# Patient Record
Sex: MALE | Employment: FULL TIME | URBAN - METROPOLITAN AREA
[De-identification: names, ages, dates, MRNs, and addresses within clinical notes are randomized per-mention and may not be internally consistent; named-entity substitution may affect disease eponyms.]

---

## 2017-01-19 ENCOUNTER — OFFICE VISIT (OUTPATIENT)
Dept: FAMILY MEDICINE | Facility: CLINIC | Age: 32
End: 2017-01-19
Payer: COMMERCIAL

## 2017-01-19 ENCOUNTER — RADIANT APPOINTMENT (OUTPATIENT)
Dept: GENERAL RADIOLOGY | Facility: CLINIC | Age: 32
End: 2017-01-19
Attending: FAMILY MEDICINE
Payer: COMMERCIAL

## 2017-01-19 VITALS
TEMPERATURE: 98.6 F | BODY MASS INDEX: 34.65 KG/M2 | WEIGHT: 242 LBS | HEART RATE: 86 BPM | HEIGHT: 70 IN | OXYGEN SATURATION: 100 % | DIASTOLIC BLOOD PRESSURE: 80 MMHG | SYSTOLIC BLOOD PRESSURE: 121 MMHG

## 2017-01-19 DIAGNOSIS — R00.2 PALPITATIONS: ICD-10-CM

## 2017-01-19 DIAGNOSIS — R07.89 CHEST DISCOMFORT: ICD-10-CM

## 2017-01-19 DIAGNOSIS — R07.89 CHEST DISCOMFORT: Primary | ICD-10-CM

## 2017-01-19 PROCEDURE — 71020 XR CHEST 2 VW: CPT

## 2017-01-19 PROCEDURE — 93000 ELECTROCARDIOGRAM COMPLETE: CPT | Performed by: FAMILY MEDICINE

## 2017-01-19 PROCEDURE — 99214 OFFICE O/P EST MOD 30 MIN: CPT | Performed by: FAMILY MEDICINE

## 2017-01-19 ASSESSMENT — PAIN SCALES - GENERAL: PAINLEVEL: NO PAIN (0)

## 2017-01-19 NOTE — PROGRESS NOTES
"  SUBJECTIVE:                                                    Jean Cross is a 32 year old male who presents to clinic today for the following health issues:      CHEST PAIN     Onset: about 3-4 days ago     Description:   Location:  left side  Character: heavy  Radiation: none  Duration: constant     Intensity: mild    Progression of Symptoms:  worsening    Accompanying Signs & Symptoms:  Shortness of breath: no  Sweating: no  Nausea/vomiting: no  Lightheadedness: no  Palpitations: YES  Fever/Chills: no  Cough: YES  Heartburn: no    History:   Family history of heart disease no  Tobacco use: no    Precipitating factors:   Worse with exertion: no  Worse with deep breaths :  no  Related to food: no    Alleviating factors:  none       Therapies Tried and outcome: none      Problem list and histories reviewed & adjusted, as indicated.  Additional history: as documented    Problem list, Medication list, Allergies, and Medical/Social/Surgical histories reviewed in EPIC and updated as appropriate.    ROS:  Constitutional, HEENT, cardiovascular, pulmonary, GI, , musculoskeletal, neuro, skin, endocrine and psych systems are negative, except as otherwise noted.    OBJECTIVE:                                                    /80 mmHg  Pulse 86  Temp(Src) 98.6  F (37  C) (Oral)  Ht 5' 10\" (1.778 m)  Wt 242 lb (109.77 kg)  BMI 34.72 kg/m2  SpO2 100%  Body mass index is 34.72 kg/(m^2).  GENERAL: healthy, alert and no distress  NECK: no adenopathy, no asymmetry, masses, or scars and thyroid normal to palpation  RESP: lungs clear to auscultation - no rales, rhonchi or wheezes  CV: regular rate and rhythm, normal S1 S2, no S3 or S4, no murmur, click or rub, no peripheral edema and peripheral pulses strong  ABDOMEN: soft, nontender, no hepatosplenomegaly, no masses and bowel sounds normal  MS: no gross musculoskeletal defects noted, no edema    Diagnostic Test Results:  none      ASSESSMENT/PLAN:                   "                                    1. Chest discomfort  Unclear etiology? No acute findings on ekg or cxr. R/o arrhythmia below with zio patch monitor. Further evaluate cardiac status with stress echo.  - EKG 12-lead complete w/read - Clinics  - XR Chest 2 Views; Future  - Exercise Stress Echocardiogram; Future    2. Palpitations    - Zio Patch 24 Hours; Future    See Patient Instructions    Gadiel Gamez MD, MD  [facility]

## 2017-01-19 NOTE — MR AVS SNAPSHOT
After Visit Summary   1/19/2017    Jean Cross    MRN: [id number]           Patient Information     Date Of Birth          1985        Visit Information        Provider Department      1/19/2017 4:00 PM Gadiel Gamez MD [facility]        Today's Diagnoses     Chest discomfort    -  1     Palpitations           Care Instructions        ================================================================================  Normal Values   Blood pressure  <140/90 for most adults    <130/80 for some chronic diseases (ask your care team about yours)    BMI (body mass index)  18.5-25 kg/m2 (based on height and weight)     Thank you for visiting [facility]    Normal or non-critical lab and imaging results will be communicated to you by MyChart, letter or phone within 7 days.  If you do not hear from us within 10 days, please call the clinic. If you have a critical or abnormal lab result, we will notify you by phone as soon as possible.     If you have any questions regarding your visit please contact:     Team Comfort:   Clinic Hours Telephone Number   Dr. James Cerna 7am-5pm  Monday - Friday [phone]  Adarsh Almanza RN   Pharmacy 8:00am-8pm Monday-Friday    9am-5pm Saturday-Sunday [phone]   Urgent Care 11am-9pm Monday-Friday        9am-5pm Saturday-Sunday [phone]     After hours, weekend or if you need to make an appointment with your primary provider please call [phone].   After Hours nurse advise: call [place] [facility]: [phone]    Medication Refills:  Call your pharmacy and they will forward the refill to us. Please allow 3 business days for your refills to be completed.                Follow-ups after your visit        Your next 10 appointments already scheduled     Jan 20, 2017  3:45 PM   CT ABDOMEN PELVIS W/O CONTRAST with MGCT1   [facility] ([facility])    [address] [place] [id number] [phone]           Please bring any scans or X-rays taken at other hospitals, if similar tests were done. Also bring a list of your medicines, including vitamins, minerals and over-the-counter drugs. It is safest to leave personal items at home.  Be sure to tell your doctor:   If you have any allergies.   If there s any chance you are pregnant.   If you are breastfeeding.   If you have any special needs.  You do not need to do anything special to prepare.  Please wear loose clothing, such as a sweat suit or jogging clothes. Avoid snaps, zippers and other metal. We may ask you to undress and put on a hospital gown.              Future tests that were ordered for you today     Open Future Orders        Priority Expected Expires Ordered    Exercise Stress Echocardiogram Routine  1/19/2018 1/19/2017    Zio Patch 24 Hours Routine  3/5/2017 1/19/2017    XR Chest 2 Views Routine 1/19/2017 1/19/2018 1/19/2017            Who to contact     If you have questions or need follow up information about today's clinic visit or your schedule please contact [facility] directly at [phone].  Normal or non-critical lab and imaging results will be communicated to you by Goods Platformhart, letter or phone within 4 business days after the clinic has received the results. If you do not hear from us within 7 days, please contact the clinic through TraktoPROt or phone. If you have a critical or abnormal lab result, we will notify you by phone as soon as possible.  Submit refill requests through Spartz or call your pharmacy and they will forward the refill request to us. Please allow 3 business days for your refill to be completed.          Additional Information About Your Visit        Spartz Information     Spartz gives you secure access to your electronic health record. If you see a primary care provider, you can also send messages  "to your care team and make appointments. If you have questions, please call your primary care clinic.  If you do not have a primary care provider, please call [phone] and they will assist you.        Care EveryWhere ID     This is your Care EveryWhere ID. This could be used by other organizations to access your [place] medical records  [id number]        Your Vitals Were     Pulse Temperature Height BMI (Body Mass Index) Pulse Oximetry       86 98.6  F (37  C) (Oral) 5' 10\" (1.778 m) 34.72 kg/m2 100%        Blood Pressure from Last 3 Encounters:   01/19/17 121/80   08/05/16 121/74   07/15/16 125/71    Weight from Last 3 Encounters:   01/19/17 242 lb (109.77 kg)   07/15/16 237 lb (107.502 kg)   06/22/16 240 lb (108.863 kg)              We Performed the Following     EKG 12-lead complete w/read - Clinics        Primary Care Provider Office Phone # Fax #    Gadiel Gamez [phone]       [facility] [address]  [facility] [address]        Thank you!     Thank you for choosing [facility]  for your care. Our goal is always to provide you with excellent care. Hearing back from our patients is one way we can continue to improve our services. Please take a few minutes to complete the written survey that you may receive in the mail after your visit with us. Thank you!             Your Updated Medication List - Protect others around you: Learn how to safely use, store and throw away your medicines at www.disposemymeds.org.          This list is accurate as of: 1/19/17  4:28 PM.  Always use your most recent med list.                   Brand Name Dispense Instructions for use    lisinopril 10 MG tablet    PRINIVIL/ZESTRIL    90 tablet    Take 1 tablet (10 mg) by mouth daily       omeprazole 20 MG CR capsule    priLOSEC    90 capsule    Take 1 capsule (20 mg) by mouth daily         "

## 2017-01-19 NOTE — NURSING NOTE
"Chief Complaint   Patient presents with     Chest Pain     heavy       Initial /80 mmHg  Pulse 86  Temp(Src) 98.6  F (37  C) (Oral)  Ht 5' 10\" (1.778 m)  Wt 242 lb (109.77 kg)  BMI 34.72 kg/m2  SpO2 100% Estimated body mass index is 34.72 kg/(m^2) as calculated from the following:    Height as of this encounter: 5' 10\" (1.778 m).    Weight as of this encounter: 242 lb (109.77 kg).  BP completed using cuff size: jenny Castrejon MA      "

## 2017-01-19 NOTE — PATIENT INSTRUCTIONS
================================================================================  Normal Values   Blood pressure  <140/90 for most adults    <130/80 for some chronic diseases (ask your care team about yours)    BMI (body mass index)  18.5-25 kg/m2 (based on height and weight)     Thank you for visiting [facility]    Normal or non-critical lab and imaging results will be communicated to you by MyChart, letter or phone within 7 days.  If you do not hear from us within 10 days, please call the clinic. If you have a critical or abnormal lab result, we will notify you by phone as soon as possible.     If you have any questions regarding your visit please contact:     Team Comfort:   Clinic Hours Telephone Number   Dr. James Cerna 7am-5pm  Monday - Friday [phone]  Adarsh Almanza RN   Pharmacy 8:00am-8pm Monday-Friday    9am-5pm Saturday-Sunday [phone]   Urgent Care 11am-9pm Monday-Friday        9am-5pm Saturday-Sunday [phone]     After hours, weekend or if you need to make an appointment with your primary provider please call [phone].   After Hours nurse advise: call [place] [facility]: [phone]    Medication Refills:  Call your pharmacy and they will forward the refill to us. Please allow 3 business days for your refills to be completed.

## 2017-01-20 ENCOUNTER — RADIANT APPOINTMENT (OUTPATIENT)
Dept: CT IMAGING | Facility: CLINIC | Age: 32
End: 2017-01-20
Attending: FAMILY MEDICINE
Payer: COMMERCIAL

## 2017-01-20 DIAGNOSIS — I88.0 MESENTERIC LYMPHADENITIS: ICD-10-CM

## 2017-01-20 DIAGNOSIS — N28.1 RENAL CYST: ICD-10-CM

## 2017-01-20 PROCEDURE — 74177 CT ABD & PELVIS W/CONTRAST: CPT | Performed by: RADIOLOGY

## 2017-01-20 RX ORDER — IOPAMIDOL 755 MG/ML
135 INJECTION, SOLUTION INTRAVASCULAR ONCE
Status: COMPLETED | OUTPATIENT
Start: 2017-01-20 | End: 2017-01-20

## 2017-01-20 RX ADMIN — IOPAMIDOL 135 ML: 755 INJECTION, SOLUTION INTRAVASCULAR at 15:48

## 2017-01-24 ENCOUNTER — MYC MEDICAL ADVICE (OUTPATIENT)
Dept: FAMILY MEDICINE | Facility: CLINIC | Age: 32
End: 2017-01-24

## 2017-01-24 ENCOUNTER — TELEPHONE (OUTPATIENT)
Dept: CARDIOLOGY | Facility: CLINIC | Age: 32
End: 2017-01-24

## 2017-01-24 DIAGNOSIS — I88.0 MESENTERIC LYMPHADENITIS: Primary | ICD-10-CM

## 2017-01-24 NOTE — TELEPHONE ENCOUNTER
Team - please inform patient that provider placed a new referral with the [facility].  Number to call to schedule appointment is [phone].  Nan Steele RN

## 2017-01-24 NOTE — TELEPHONE ENCOUNTER
Called patient prior to stress echo scheduled for Jan 26 to discuss necessary preparation for test and assess for questions/concerns.  Reminded patient to remain NPO except water for at least 3 hours prior to test, take all medications as usual, to avoid caffeine and nicotine for 12 hr (including chocolate, decaf, Excedrin) and to wear comfortable clothing and shoes. Also informed patient that procedure is on a recumbent bike rather than treadmill. All questions answered, patient verbalized understanding.

## 2017-01-24 NOTE — TELEPHONE ENCOUNTER
Reason for Call:  Other appointment    Detailed comments: Pt called for he tried to make an apt with the GI Specialist and they told him that only Dr. Gamez can make the apt.  He would like for Dr. Gamez to call to make the apt as soon as possible and then to contact the Pt to confirm that an apt has been made.     Phone Number Patient can be reached at: Other phone number:  [phone]    Best Time: Anytime    Can we leave a detailed message on this number? YES    Call taken on 1/24/2017 at 4:22 PM by Kelvin Abdi

## 2017-01-26 ENCOUNTER — RADIANT APPOINTMENT (OUTPATIENT)
Dept: CARDIOLOGY | Facility: CLINIC | Age: 32
End: 2017-01-26
Attending: FAMILY MEDICINE
Payer: COMMERCIAL

## 2017-01-26 VITALS — DIASTOLIC BLOOD PRESSURE: 69 MMHG | SYSTOLIC BLOOD PRESSURE: 113 MMHG | HEART RATE: 89 BPM

## 2017-01-26 DIAGNOSIS — R00.2 PALPITATIONS: ICD-10-CM

## 2017-01-26 DIAGNOSIS — I47.10 PAROXYSMAL SUPRAVENTRICULAR TACHYCARDIA (H): Primary | ICD-10-CM

## 2017-01-26 DIAGNOSIS — R07.89 CHEST DISCOMFORT: ICD-10-CM

## 2017-01-26 PROCEDURE — 93325 DOPPLER ECHO COLOR FLOW MAPG: CPT | Mod: TC | Performed by: INTERNAL MEDICINE

## 2017-01-26 PROCEDURE — 93350 STRESS TTE ONLY: CPT | Mod: TC | Performed by: INTERNAL MEDICINE

## 2017-01-26 PROCEDURE — 93350 STRESS TTE ONLY: CPT | Mod: 26 | Performed by: INTERNAL MEDICINE

## 2017-01-26 PROCEDURE — 93225 XTRNL ECG REC<48 HRS REC: CPT

## 2017-01-26 PROCEDURE — 93325 DOPPLER ECHO COLOR FLOW MAPG: CPT | Mod: 26 | Performed by: INTERNAL MEDICINE

## 2017-01-26 PROCEDURE — 93321 DOPPLER ECHO F-UP/LMTD STD: CPT | Mod: TC | Performed by: INTERNAL MEDICINE

## 2017-01-26 PROCEDURE — 93321 DOPPLER ECHO F-UP/LMTD STD: CPT | Mod: 26 | Performed by: INTERNAL MEDICINE

## 2017-01-26 PROCEDURE — 93016 CV STRESS TEST SUPVJ ONLY: CPT | Performed by: INTERNAL MEDICINE

## 2017-01-26 PROCEDURE — 93352 ADMIN ECG CONTRAST AGENT: CPT | Performed by: INTERNAL MEDICINE

## 2017-01-26 PROCEDURE — 93227 XTRNL ECG REC<48 HR R&I: CPT

## 2017-01-26 PROCEDURE — 93017 CV STRESS TEST TRACING ONLY: CPT | Performed by: INTERNAL MEDICINE

## 2017-01-26 PROCEDURE — 93018 CV STRESS TEST I&R ONLY: CPT | Performed by: INTERNAL MEDICINE

## 2017-01-26 RX ADMIN — Medication 6 ML: at 15:49

## 2017-01-26 NOTE — PROGRESS NOTES
Patient has been prescribed a ZioPatch holter for 1 days.  Patient was instructed regarding the indication, function, care and prompt return of the ZioPatch holter monitor. The monitor, with S/N [id number],  was placed on the patient with instructions regarding care of the skin, electrodes, and monitor, as well as documentation in the patient diary. Patient demonstrated understanding of this information and agreed to call [employer] with further questions or concerns.

## 2017-01-26 NOTE — PROGRESS NOTES
Patient presents today for stress echo ordered by MD. Prior to patient visit, chart prep by CMA done including confirmation of order, medications reviewed for contraindications, reviewed previous EKG's for trends & concerns and reviewed patient's medical history.    IV started in R AC with a 22G Jeclo Catheter.     Echo technician completed resting portion of echo.    Stress portion of echo completed utilizing bike and pictures taken at peak.  Blood pressure taken every 2 minutes and documented in Muse system.    Difinity medication used 6cc, wasted 4cc Definity NDC # 67373-162-12 (1.5ml Definity mixed with 8.5ml Saline )  Atropine medication given  NONE Atropine NDC# 07309-334-54     Patient offered complaints of: NONE    After completion of stress echo, recovery period with blood pressure monitoring occurs at 1, 3 and 5 minutes and documented in Muse.  IV removed and water provided to patient.    Patient education provided about cardiology interpretation and primary provider will be notified of results.    Dr. Gamez provided supervision of the tests performed today.    Marni Diehl, CCT, Cardiac CMA

## 2017-01-31 ENCOUNTER — PRE VISIT (OUTPATIENT)
Dept: GASTROENTEROLOGY | Facility: CLINIC | Age: 32
End: 2017-01-31

## 2017-01-31 NOTE — TELEPHONE ENCOUNTER
1.  Date/reason for appt: 2/28/17 -- mesenteric lymphadenitis  2.  Referring provider: Gadiel Gamez  3.  Call to patient (Yes / No - short description): no, referred  4.  Previous care at / records requested from: [facility] -- records and imaging in epic/pacs

## 2017-02-07 PROBLEM — I47.10 PAROXYSMAL SUPRAVENTRICULAR TACHYCARDIA (H): Status: ACTIVE | Noted: 2017-02-07

## 2017-02-28 ENCOUNTER — OFFICE VISIT (OUTPATIENT)
Dept: GASTROENTEROLOGY | Facility: CLINIC | Age: 32
End: 2017-02-28

## 2017-02-28 VITALS
OXYGEN SATURATION: 100 % | BODY MASS INDEX: 34.02 KG/M2 | HEART RATE: 75 BPM | HEIGHT: 71 IN | WEIGHT: 243 LBS | DIASTOLIC BLOOD PRESSURE: 76 MMHG | SYSTOLIC BLOOD PRESSURE: 127 MMHG

## 2017-02-28 DIAGNOSIS — E55.9 VITAMIN D DEFICIENCY: ICD-10-CM

## 2017-02-28 DIAGNOSIS — R59.0 MESENTERIC LYMPHADENOPATHY: Primary | ICD-10-CM

## 2017-02-28 LAB
ALBUMIN SERPL-MCNC: 4 G/DL (ref 3.4–5)
ALP SERPL-CCNC: 107 U/L (ref 40–150)
ALT SERPL W P-5'-P-CCNC: 52 U/L (ref 0–70)
ANION GAP SERPL CALCULATED.3IONS-SCNC: 11 MMOL/L (ref 3–14)
AST SERPL W P-5'-P-CCNC: 24 U/L (ref 0–45)
BASOPHILS # BLD AUTO: 0 10E9/L (ref 0–0.2)
BASOPHILS NFR BLD AUTO: 0.4 %
BILIRUB SERPL-MCNC: 0.5 MG/DL (ref 0.2–1.3)
BUN SERPL-MCNC: 13 MG/DL (ref 7–30)
CALCIUM SERPL-MCNC: 8.7 MG/DL (ref 8.5–10.1)
CHLORIDE SERPL-SCNC: 105 MMOL/L (ref 94–109)
CO2 SERPL-SCNC: 26 MMOL/L (ref 20–32)
CREAT SERPL-MCNC: 0.91 MG/DL (ref 0.66–1.25)
CRP SERPL-MCNC: 3.2 MG/L (ref 0–8)
DEPRECATED CALCIDIOL+CALCIFEROL SERPL-MC: 9 UG/L (ref 20–75)
DIFFERENTIAL METHOD BLD: NORMAL
ENA RNP IGG SER IA-ACNC: NORMAL AI (ref 0–0.9)
ENA SCL70 IGG SER IA-ACNC: NORMAL AI (ref 0–0.9)
ENA SM IGG SER-ACNC: NORMAL AI (ref 0–0.9)
ENA SS-A IGG SER IA-ACNC: NORMAL AI (ref 0–0.9)
ENA SS-B IGG SER IA-ACNC: NORMAL AI (ref 0–0.9)
EOSINOPHIL # BLD AUTO: 0.2 10E9/L (ref 0–0.7)
EOSINOPHIL NFR BLD AUTO: 2.2 %
ERYTHROCYTE [DISTWIDTH] IN BLOOD BY AUTOMATED COUNT: 13 % (ref 10–15)
ERYTHROCYTE [SEDIMENTATION RATE] IN BLOOD BY WESTERGREN METHOD: 7 MM/H (ref 0–15)
FERRITIN SERPL-MCNC: 19 NG/ML (ref 26–388)
FOLATE SERPL-MCNC: 17.2 NG/ML
GFR SERPL CREATININE-BSD FRML MDRD: NORMAL ML/MIN/1.7M2
GLUCOSE SERPL-MCNC: 91 MG/DL (ref 70–99)
HCT VFR BLD AUTO: 46.7 % (ref 40–53)
HGB BLD-MCNC: 15.4 G/DL (ref 13.3–17.7)
IGA SERPL-MCNC: 319 MG/DL (ref 70–380)
IMM GRANULOCYTES # BLD: 0 10E9/L (ref 0–0.4)
IMM GRANULOCYTES NFR BLD: 0.4 %
IRON SATN MFR SERPL: 28 % (ref 15–46)
IRON SERPL-MCNC: 108 UG/DL (ref 35–180)
LIPASE SERPL-CCNC: 88 U/L (ref 73–393)
LYMPHOCYTES # BLD AUTO: 3.9 10E9/L (ref 0.8–5.3)
LYMPHOCYTES NFR BLD AUTO: 50 %
MCH RBC QN AUTO: 28.2 PG (ref 26.5–33)
MCHC RBC AUTO-ENTMCNC: 33 G/DL (ref 31.5–36.5)
MCV RBC AUTO: 85 FL (ref 78–100)
MONOCYTES # BLD AUTO: 0.6 10E9/L (ref 0–1.3)
MONOCYTES NFR BLD AUTO: 7.1 %
NEUTROPHILS # BLD AUTO: 3.1 10E9/L (ref 1.6–8.3)
NEUTROPHILS NFR BLD AUTO: 39.9 %
NRBC # BLD AUTO: 0 10*3/UL
NRBC BLD AUTO-RTO: 0 /100
PLATELET # BLD AUTO: 190 10E9/L (ref 150–450)
POTASSIUM SERPL-SCNC: 3.9 MMOL/L (ref 3.4–5.3)
PROT SERPL-MCNC: 7.6 G/DL (ref 6.8–8.8)
RBC # BLD AUTO: 5.47 10E12/L (ref 4.4–5.9)
RETICS # AUTO: 68.6 10E9/L (ref 25–95)
RETICS/RBC NFR AUTO: 1.3 % (ref 0.5–2)
RHEUMATOID FACT SER NEPH-ACNC: <20 IU/ML (ref 0–20)
SODIUM SERPL-SCNC: 142 MMOL/L (ref 133–144)
TIBC SERPL-MCNC: 383 UG/DL (ref 240–430)
TSH SERPL DL<=0.05 MIU/L-ACNC: 0.84 MU/L (ref 0.4–4)
VIT B12 SERPL-MCNC: 345 PG/ML (ref 193–986)
WBC # BLD AUTO: 7.8 10E9/L (ref 4–11)

## 2017-02-28 RX ORDER — POLYETHYLENE GLYCOL 3350 17 G/17G
1 POWDER, FOR SOLUTION ORAL DAILY
COMMUNITY
End: 2017-02-28

## 2017-02-28 ASSESSMENT — PAIN SCALES - GENERAL: PAINLEVEL: NO PAIN (0)

## 2017-02-28 NOTE — PATIENT INSTRUCTIONS
"1. Check labs today  Lab tests today at the 1st floor -- you will be notified of results by letter or my chart message in 7-10 days.  You will receive a phone call if more urgent follow up is needed.      2. Schedule a capsule endoscopy. Please be sure to not take any advil, ibuprofen, motrin or like drugs before the test  March 13  Check in time is 1045    Please call [phone] to schedule   Endoscopy Department  [address] [address]    Go to the drug store to fill your prescription for GoLytely (Sometimes called CoLyte) and Dulcolax (bisacodyl). You might also buy Tucks wipes, Vaseline and other items. (See  Tips for Colon Cleansing\")    Begin low fiber diet 3 days before your exam. Stop all solid foods by 1:00 PM the day before your exam.    LOW  FIBER DIET  Starches: White bread, rolls, biscuits, croissants, Glidden toast, white flour tortillas, waffles, pancakes, Mohawk toast; white rice, noodles; plain crackers, saltines; cooked farina or cream of rice; puffed rice, corn flakes, Rice Krispies, Special K.  Vegetables: Tender cooked and canned vegetables without seeds, including carrots, asparagus tips, green beans, wax beans, spinach; vegetable broths.  Fruits and Fruit Juices: Strained fruit juice, canned fruit without seeds or skin (not pineapple), applesauce, pear sauce, ripe bananas, melons (NOT watermelon).  Milk Products: Milk (plain or flavored), cheese, cottage cheese, yogurt (no berries), custard, ice cream (no nuts).  Proteins: Tender, well-cooked ground beef, lamb, veal, ham, pork, chicken, turkey, fish or organ meats; eggs; creamy peanut butter.  Fats and Condiments: Margarine, butter, oils, mayonnaise, sour cream, salad dressing, plain gravy; spices, cooked herbs; bouillon; sugar, clear jelly, honey, syrup.  Snacks, sweets and drinks: Pretzels, hard candy; plain cakes and cookies (no nuts or seeds); gelatin, plain pudding, " sherbet, popsicles; coffee, tea, carbonated ( Fizzy ) drinks.     The day before your Video Capsule Study:  Start a Clear liquid diet see below for examples.   At 1 p.m.:  Begin Drinking clear liquids (see list of clear liquids below). Drink at least 8-10 full glasses of clear liquid during the day.    Use warm water to fill the jug that contains your GoLytely powder. Cover and shake until the powder dissolves. You must use a full gallon of water to dissolve the powder. Chill the liquid for at least three hours. Do not add ice.  Between 4 and 6 p.m., start drinking the GoLytely as fast as you can. Drink an 8-ounce glass every 10-15 minutes. Drink 8 glasses.  After 10 p.m., quickly drink the rest of the GoLytely (one glass every 10-15 minutes) until it is gone.   Stay near a toilet when using this medicine. You may have diarrhea (watery stools), mild cramping, bloating and nausea. Your colon must be clean for the doctor to do this exam. If you do not take the medicine as ordered, we may cancel your test.  The day of your Video Capsule Study  Do not eat or drink after 12 midnight. You may take necessary medications with sips of water.   Do not take any medications 2 hours prior to swallowing the capsule.   Do not smoke 24 hours before taking capsule.     Tips for Colon Cleansing:  To get accurate results from your exam, your colon must be clean and empty. Please follow your doctor s instructions. If you do not, you may need to repeat both the exam and the colon-cleansing process.  The medicine you will take may cause bloating, nausea and other discomfort. Follow these tips to make the process as easy as possible:  You may use alcohol-free baby wipes to ease anal irritation. You may also use Vaseline to help protect the skin. Other options include Tucks wipes, hemorrhoid treatments and hydrocortisone cream.   Most people prefer the unflavored version of GoLytely. (You may request this at the pharmacy). You may wish to  squeeze some lemon juice into it or add a packet of sugar free crystal light (not red or purple). This is fine.   To chill the solution, put it in your refrigerator or set it in a bowl of ice. Do NOT add ice in your drinking glass. You may remove the GoLytely from the refrigerator 15-30 minutes before drinking.  Quickly drink one whole glass every 10-15 minutes. It may help to use a timer. If the liquid is too salty, you may use a straw.   Stay near a toilet!  You will have diarrhea (watery, loose stools) and may also have chills. Dress for comfort.  Expect to feel discomfort until the stool clears from your colon. This takes about 2-4 hours.   Even when you are sitting on the toilet, keep drinking a glass of solution every 10-15 minutes.  If you have nausea or vomiting (throwing up), rinse your mouth with water. Take a break for 15-30 minutes, and then keep drinking the solution.  Some people find it helpful to suck on a wedge of lime or lemon. You may also try sucking on hard candy (not red or purple) or washing your mouth out with water, clear soda or mouthwash.   If you followed your doctor s orders and your stool is clear or yellow liquid, then you are ready for the exam.  If you are not sure if your colon is clean, please call.      CLEAR LIQUID DIET  Water, tea, coffee (no cream)  Soda pop, Gatorade (not red or purple)  Clear nutrition drinks (Enlive, Resource Breeze)  Jell-O, Popsicles (no milk or fruit pieces) or sorbet (not red or purple)  Fat-Free soup broth or bouillon  Plain hard candy, such as clear lifesavers (not red or purple)  Clear juices and fruit-flavored drinks such as apple juice, white grape juice, Hi-C and Rayo-Aid (not red or purple)          3. After we get these results we will decide if you need to see an oncologist.      Follow up in 4 months or sooner if needed        Call with questions.    For questions regarding your care Monday through Friday, contact the RN GI care coordinator,   Call Syeda Moran at  [phone] option 3 and leave a voicemail. Your call will be  returned same day, or if consultation is needed with the provider, it may be following business day - or you may send a My Chart message.    For medication refills (prescribed by the GI clinic), contact your pharmacy.    For appointment rescheduling/cancellation, contact [phone]     After hours, or if you have an immediate GI concern and cannot wait for a return call, contact the GI Fellow at [phone] and select option #4.

## 2017-02-28 NOTE — PROGRESS NOTES
OUTPATIENT GI CONSULT NOTE      PRIMARY CARE PHYSICIAN:  Dr. Gadiel Gamez.      REASON FOR REFERRAL:  Abnormal CT scan.      CHIEF COMPLAINT:  Occasional constipation and bloating.      HISTORY OF PRESENT ILLNESS:  Jean Cross is a very pleasant 32-year-old gentleman who is here today to discuss symptoms and CT findings.  The patient notes that he was in his normal state of health until about 04/2016.  He then began developing some decreased appetite and some bloating.  He underwent an EGD that was unremarkable with a normal esophagus, stomach and small intestine.  H. pylori stool antigen and biopsies of the stomach were negative.  The patient was given a trial of PPI, and he believes this did help some of the reflux and burning sensation he was having at that time.  This resolved the reflux, and he has had no other issues with reflux going forward.  The patient was also found to have a short course of left-sided abdominal discomfort and tenderness on exam.  He was given a trial on antibiotics for possible diverticulitis.  He had a colonoscopy, which showed no significant diverticular disease, a normal ileum and a normal colon.  He underwent a CT scan, which did not show any diverticulitis.  It showed some scattered small diverticula, but it did show some minimal fat stranding surrounding several mesenteric lymph nodes in the left mid abdomen, which was nonspecific.  The radiologist said this could be seen in mesenteric lymphadenitis, inflammatory bowel disease or other inflammatory or infectious process.  There were some granulomas seen in the liver and a right kidney cyst.  Over the course of the next several months, the patient did well.  He was started on Metamucil in the evening, and this really helped with his underlying constipation.  He has 1 bowel movement a day.  Sometimes, he still feels he has to strain.  In general, his appetite returned.  He never lost any weight.  He thinks he is doing quite well, and he  is very happy with how he is doing.  Occasionally, he can feel bloated, and that is made worse by drinking milk.      The patient did have a repeat CT scan in January, and the patient was disappointed to find out that it showed persistent mesenteric nonspecific lymphadenopathy with some evidence of retroperitoneal inflammation.  The patient was then referred to the GI Clinic for further evaluation.      Again, the patient is doing quite well today.  He has 1 bowel movement a day with occasional straining.  He has no blood.  He has no diarrhea.  He has no nausea, vomiting or GERD symptoms.  His appetite is good, and he is eating and drinking well.  He has not had any weight loss.  He has no fevers, chills or sweats.  He has no rashes, no joint pains, no mouth sores.  No vision changes.  No shortness of breath.      He has been having some palpitations, and he had a Holter monitor.  He is following up with Cardiology.      REVIEW OF SYSTEMS:  A complete review of systems is performed.  Pertinent positives and negatives are as stated above in the HPI.  The remainder of a complete review of systems is unremarkable.      PAST MEDICAL HISTORY:   1.  Hypertension.   2.  Palpitations, currently undergoing cardiac evaluation.      MEDICATIONS:   1.  Lisinopril.   2.  Metamucil once daily.      The patient does note that he had fallen a few months before his symptoms began.  He was taking Advil on a regular basis for at least a couple of months.  He has not taken Advil for 9-10 months.      FAMILY HISTORY:  His cousin was diagnosed with lymphoma in her 20s.  He is worried that he could have lymphoma.  No history of colon cancer or colon polyps.  No history of inflammatory bowel disease, Crohn's disease or ulcerative colitis.  No history of pancreas or liver disease.      SOCIAL HISTORY:  The [place] in the [place] of the [facility].  He does not smoke.  He occasionally drinks alcohol, about 3-4 per month.  He  is accompanied by his nemesio wife today.  He uses no illicit drugs, including no cocaine, no methamphetamines, no PCP, no marijuana or any other.      ALLERGIES:  No known drug allergies.      PHYSICAL EXAMINATION:   VITAL SIGNS:  Blood pressure 127/76, weight 243 pounds, height 5 feet 11 inches, pulse 75, satting 100% on room air.   GENERAL:  He is pleasant, in no acute distress.   HEENT:  Head is atraumatic, normocephalic.  Sclerae are anicteric without injection.  Oropharynx is clear with moist mucous membranes.   NECK:  Supple.  There is no lymphadenopathy, no thyromegaly.   HEART:  Normal rate.   LUNGS:  Clear to auscultation.   ABDOMEN:  Soft, nontender and nondistended, no rebound or guarding.  Mildly obese.   EXTREMITIES:  No clubbing, cyanosis or edema.   SKIN:  No evidence of rash.   JOINTS:  No evidence of synovitis.   NEUROLOGIC:  Awake, alert and oriented x3 with no focal deficits.      LABORATORY DATA:  Reviewed.  Last labs were done in 07/2016 with a normal lipase and a normal hemogram.  LFTs and basic metabolic panel were normal in 06/2016.  H. pylori stool antigen negative as well.      Imaging, colonoscopy and endoscopy reviewed.      ASSESSMENT AND PLAN:  Jean Cross is a very pleasant 32-year-old gentleman who had a self-limited course of decreased appetite, bloating and constipation.  This was managed with a course of a trial of PPI and increasing his fiber.  Overall, he is doing quite well, and has only minimal residual symptoms of bloating and occasional straining.  For his bloating, I recommend he try some Gas-X and avoid milk products, soda products, sipping with a straw and chewing gum.  I also think that some of the bloating could be related to some residual constipation.  I recommend he increase his Metamucil up to twice a day, and possibly 3 times a day.  If he has any residual constipation.  He can take MiraLax.      A more tricky question is what to do with the CT findings.  There is  some persistent lymphadenopathy and some stranding in the peritoneal and retroperitoneal space that sounds like this could be sclerosing mesenteritis.  Sclerosing mesenteritis is an entity that not much is known about.  Often, this can be just an incidental finding that this does progress and does not cause any issues.  Occasionally, it can progress and actually contribute to symptoms (obstruction, etc).  I do not think that this is actually causing his symptoms, and I think it may be an incidental finding.  I do note that the patient has had an unremarkable EGD and colonoscopy.  However, because we have these findings on CT, I think there are certain things we should rule out.  Given his family history of lymphoma, I think we should obtain a peripheral blood smear and look for any evidence of abnormalities.  Depending on what is found, we may consider having him be seen by Hematology/Oncology for further evaluation.  I do also think we should do blood work to look for any evidence of vasculitis, which can sometimes be associated with these findings.  Therefore, we will check an CINDY, a double-stranded DNA, an EMMANUEL panel and rheumatoid factor.  I find no other evidence of vasculitis on exam or history, so I think this is less likely.  I recommend that we check some immunoglobulins as well.  Finally, I do recommend that we perform a video capsule endoscopy to make sure there is no subtle Crohn's in the small bowel.  Once we have all these findings, we will see if there is any further evaluation needed.  If everything is negative, then I think we can just repeat imaging in a year or so to make sure things are stable.  I am very encouraged by the fact that the patient is doing well, and I do not find any red flags at this time.  I encouraged the patient that this likely is just an incidental finding, and it may require any further evaluation; however, we should rule out the processes above with the labs and studies as  outlined.      The patient and his wife agree with this course of evaluation, and they will complete the recommended studies.         OBED PASTOR MD             D: 2017 11:03   T: 2017 13:26   MT: TIANA      Name:     YOLANDA MARVIN   MRN:      [id number]        Account:      [id number]   :      1985           Service Date: 2017      Document: [id number]

## 2017-02-28 NOTE — NURSING NOTE
"Chief Complaint   Patient presents with     Consult     Mesentaeric Lymphadenitis       Vitals:    02/28/17 0957   BP: 127/76   Pulse: 75   SpO2: 100%   Weight: 110.2 kg (243 lb)   Height: 1.803 m (5' 11\")       Body mass index is 33.89 kg/(m^2).                          "

## 2017-02-28 NOTE — PROGRESS NOTES
Note dictated. Job code [id number].    Ej Goodman MD    [facility]  Division of Gastroenterology, Hepatology and Nutrition

## 2017-02-28 NOTE — LETTER
2/28/2017       RE: Jean Cross  [address]  [facility] [address]     Dear Colleague,    Thank you for referring your patient, Jean Cross, to the [facility] GASTROENTEROLOGY AND IBD at [facility]. Please see a copy of my visit note below.    Note dictated. Job code [id number].    Ej Goodman MD    [facility]  Division of Gastroenterology, Hepatology and Nutrition      OUTPATIENT GI CONSULT NOTE      PRIMARY CARE PHYSICIAN:  Dr. Gadiel Gamez.      REASON FOR REFERRAL:  Abnormal CT scan.      CHIEF COMPLAINT:  Occasional constipation and bloating.      HISTORY OF PRESENT ILLNESS:  Jean Cross is a very pleasant 32-year-old gentleman who is here today to discuss symptoms and CT findings.  The patient notes that he was in his normal state of health until about 04/2016.  He then began developing some decreased appetite and some bloating.  He underwent an EGD that was unremarkable with a normal esophagus, stomach and small intestine.  H. pylori stool antigen and biopsies of the stomach were negative.  The patient was given a trial of PPI, and he believes this did help some of the reflux and burning sensation he was having at that time.  This resolved the reflux, and he has had no other issues with reflux going forward.  The patient was also found to have a short course of left-sided abdominal discomfort and tenderness on exam.  He was given a trial on antibiotics for possible diverticulitis.  He had a colonoscopy, which showed no significant diverticular disease, a normal ileum and a normal colon.  He underwent a CT scan, which did not show any diverticulitis.  It showed some scattered small diverticula, but it did show some minimal fat stranding surrounding several mesenteric lymph nodes in the left mid abdomen, which was nonspecific.  The radiologist said this could be seen in mesenteric lymphadenitis, inflammatory bowel disease or other  inflammatory or infectious process.  There were some granulomas seen in the liver and a right kidney cyst.  Over the course of the next several months, the patient did well.  He was started on Metamucil in the evening, and this really helped with his underlying constipation.  He has 1 bowel movement a day.  Sometimes, he still feels he has to strain.  In general, his appetite returned.  He never lost any weight.  He thinks he is doing quite well, and he is very happy with how he is doing.  Occasionally, he can feel bloated, and that is made worse by drinking milk.      The patient did have a repeat CT scan in January, and the patient was disappointed to find out that it showed persistent mesenteric nonspecific lymphadenopathy with some evidence of retroperitoneal inflammation.  The patient was then referred to the GI Clinic for further evaluation.      Again, the patient is doing quite well today.  He has 1 bowel movement a day with occasional straining.  He has no blood.  He has no diarrhea.  He has no nausea, vomiting or GERD symptoms.  His appetite is good, and he is eating and drinking well.  He has not had any weight loss.  He has no fevers, chills or sweats.  He has no rashes, no joint pains, no mouth sores.  No vision changes.  No shortness of breath.      He has been having some palpitations, and he had a Holter monitor.  He is following up with Cardiology.      REVIEW OF SYSTEMS:  A complete review of systems is performed.  Pertinent positives and negatives are as stated above in the HPI.  The remainder of a complete review of systems is unremarkable.      PAST MEDICAL HISTORY:   1.  Hypertension.   2.  Palpitations, currently undergoing cardiac evaluation.      MEDICATIONS:   1.  Lisinopril.   2.  Metamucil once daily.      The patient does note that he had fallen a few months before his symptoms began.  He was taking Advil on a regular basis for at least a couple of months.  He has not taken Advil for  9-10 months.      FAMILY HISTORY:  His cousin was diagnosed with lymphoma in her 20s.  He is worried that he could have lymphoma.  No history of colon cancer or colon polyps.  No history of inflammatory bowel disease, Crohn's disease or ulcerative colitis.  No history of pancreas or liver disease.      SOCIAL HISTORY:  [place] the [facility].  He does not smoke.  He occasionally drinks alcohol, about 3-4 per month.  He is accompanied by his nemesio wife today.  He uses no illicit drugs, including no cocaine, no methamphetamines, no PCP, no marijuana or any other.      ALLERGIES:  No known drug allergies.      PHYSICAL EXAMINATION:   VITAL SIGNS:  Blood pressure 127/76, weight 243 pounds, height 5 feet 11 inches, pulse 75, satting 100% on room air.   GENERAL:  He is pleasant, in no acute distress.   HEENT:  Head is atraumatic, normocephalic.  Sclerae are anicteric without injection.  Oropharynx is clear with moist mucous membranes.   NECK:  Supple.  There is no lymphadenopathy, no thyromegaly.   HEART:  Normal rate.   LUNGS:  Clear to auscultation.   ABDOMEN:  Soft, nontender and nondistended, no rebound or guarding.  Mildly obese.   EXTREMITIES:  No clubbing, cyanosis or edema.   SKIN:  No evidence of rash.   JOINTS:  No evidence of synovitis.   NEUROLOGIC:  Awake, alert and oriented x3 with no focal deficits.      LABORATORY DATA:  Reviewed.  Last labs were done in 07/2016 with a normal lipase and a normal hemogram.  LFTs and basic metabolic panel were normal in 06/2016.  H. pylori stool antigen negative as well.      Imaging, colonoscopy and endoscopy reviewed.      ASSESSMENT AND PLAN:  Jean Cross is a very pleasant 32-year-old gentleman who had a self-limited course of decreased appetite, bloating and constipation.  This was managed with a course of a trial of PPI and increasing his fiber.  Overall, he is doing quite well, and has only minimal residual symptoms of bloating  and occasional straining.  For his bloating, I recommend he try some Gas-X and avoid milk products, soda products, sipping with a straw and chewing gum.  I also think that some of the bloating could be related to some residual constipation.  I recommend he increase his Metamucil up to twice a day, and possibly 3 times a day.  If he has any residual constipation.  He can take MiraLax.      A more tricky question is what to do with the CT findings.  There is some persistent lymphadenopathy and some stranding in the peritoneal and retroperitoneal space that sounds like this could be sclerosing mesenteritis.  Sclerosing mesenteritis is an entity that not much is known about.  Often, this can be just an incidental finding that this does progress and does not cause any issues.  Occasionally, it can progress and actually contribute to symptoms (obstruction, etc).  I do not think that this is actually causing his symptoms, and I think it may be an incidental finding.  I do note that the patient has had an unremarkable EGD and colonoscopy.  However, because we have these findings on CT, I think there are certain things we should rule out.  Given his family history of lymphoma, I think we should obtain a peripheral blood smear and look for any evidence of abnormalities.  Depending on what is found, we may consider having him be seen by Hematology/Oncology for further evaluation.  I do also think we should do blood work to look for any evidence of vasculitis, which can sometimes be associated with these findings.  Therefore, we will check an CINDY, a double-stranded DNA, an EMMANUEL panel and rheumatoid factor.  I find no other evidence of vasculitis on exam or history, so I think this is less likely.  I recommend that we check some immunoglobulins as well.  Finally, I do recommend that we perform a video capsule endoscopy to make sure there is no subtle Crohn's in the small bowel.  Once we have all these findings, we will see if  there is any further evaluation needed.  If everything is negative, then I think we can just repeat imaging in a year or so to make sure things are stable.  I am very encouraged by the fact that the patient is doing well, and I do not find any red flags at this time.  I encouraged the patient that this likely is just an incidental finding, and it may require any further evaluation; however, we should rule out the processes above with the labs and studies as outlined.      The patient and his wife agree with this course of evaluation, and they will complete the recommended studies.         OBED PASTOR MD             D: 2017 11:03   T: 2017 13:26   MT: TIANA      Name:     YOLANDA MARVIN   MRN:      [id number]        Account:      [id number]   :      1985           Service Date: 2017      Document: [id number]

## 2017-02-28 NOTE — MR AVS SNAPSHOT
"              After Visit Summary   2/28/2017    Jean Cross    MRN: [id number]           Patient Information     Date Of Birth          1985        Visit Information        Provider Department      2/28/2017 10:00 AM Ej Goodman MD [facility] Gastroenterology and IBD        Today's Diagnoses     Mesenteric lymphadenopathy    -  1      Care Instructions    1. Check labs today  Lab tests today at the 1st floor -- you will be notified of results by letter or my chart message in 7-10 days.  You will receive a phone call if more urgent follow up is needed.      2. Schedule a capsule endoscopy. Please be sure to not take any advil, ibuprofen, motrin or like drugs before the test  March 13  Check in time is 1045    Please call [phone] to schedule   Endoscopy Department  [address] [address]    Go to the drug store to fill your prescription for GoLytely (Sometimes called CoLyte) and Dulcolax (bisacodyl). You might also buy Tucks wipes, Vaseline and other items. (See  Tips for Colon Cleansing\")    Begin low fiber diet 3 days before your exam. Stop all solid foods by 1:00 PM the day before your exam.    LOW  FIBER DIET  Starches: White bread, rolls, biscuits, croissants, Khushi toast, white flour tortillas, waffles, pancakes, Romansh toast; white rice, noodles; plain crackers, saltines; cooked farina or cream of rice; puffed rice, corn flakes, Rice Krispies, Special K.  Vegetables: Tender cooked and canned vegetables without seeds, including carrots, asparagus tips, green beans, wax beans, spinach; vegetable broths.  Fruits and Fruit Juices: Strained fruit juice, canned fruit without seeds or skin (not pineapple), applesauce, pear sauce, ripe bananas, melons (NOT watermelon).  Milk Products: Milk (plain or flavored), cheese, cottage cheese, yogurt (no berries), custard, ice cream (no nuts).  Proteins: Tender, well-cooked ground beef, " lamb, veal, ham, pork, chicken, turkey, fish or organ meats; eggs; creamy peanut butter.  Fats and Condiments: Margarine, butter, oils, mayonnaise, sour cream, salad dressing, plain gravy; spices, cooked herbs; bouillon; sugar, clear jelly, honey, syrup.  Snacks, sweets and drinks: Pretzels, hard candy; plain cakes and cookies (no nuts or seeds); gelatin, plain pudding, sherbet, popsicles; coffee, tea, carbonated ( Fizzy ) drinks.     The day before your Video Capsule Study:  Start a Clear liquid diet see below for examples.   At 1 p.m.:  Begin Drinking clear liquids (see list of clear liquids below). Drink at least 8-10 full glasses of clear liquid during the day.    Use warm water to fill the jug that contains your GoLytely powder. Cover and shake until the powder dissolves. You must use a full gallon of water to dissolve the powder. Chill the liquid for at least three hours. Do not add ice.  Between 4 and 6 p.m., start drinking the GoLytely as fast as you can. Drink an 8-ounce glass every 10-15 minutes. Drink 8 glasses.  After 10 p.m., quickly drink the rest of the GoLytely (one glass every 10-15 minutes) until it is gone.   Stay near a toilet when using this medicine. You may have diarrhea (watery stools), mild cramping, bloating and nausea. Your colon must be clean for the doctor to do this exam. If you do not take the medicine as ordered, we may cancel your test.  The day of your Video Capsule Study  Do not eat or drink after 12 midnight. You may take necessary medications with sips of water.   Do not take any medications 2 hours prior to swallowing the capsule.   Do not smoke 24 hours before taking capsule.     Tips for Colon Cleansing:  To get accurate results from your exam, your colon must be clean and empty. Please follow your doctor s instructions. If you do not, you may need to repeat both the exam and the colon-cleansing process.  The medicine you will take may cause bloating, nausea and other  discomfort. Follow these tips to make the process as easy as possible:  You may use alcohol-free baby wipes to ease anal irritation. You may also use Vaseline to help protect the skin. Other options include Tucks wipes, hemorrhoid treatments and hydrocortisone cream.   Most people prefer the unflavored version of GoLytely. (You may request this at the pharmacy). You may wish to squeeze some lemon juice into it or add a packet of sugar free crystal light (not red or purple). This is fine.   To chill the solution, put it in your refrigerator or set it in a bowl of ice. Do NOT add ice in your drinking glass. You may remove the GoLytely from the refrigerator 15-30 minutes before drinking.  Quickly drink one whole glass every 10-15 minutes. It may help to use a timer. If the liquid is too salty, you may use a straw.   Stay near a toilet!  You will have diarrhea (watery, loose stools) and may also have chills. Dress for comfort.  Expect to feel discomfort until the stool clears from your colon. This takes about 2-4 hours.   Even when you are sitting on the toilet, keep drinking a glass of solution every 10-15 minutes.  If you have nausea or vomiting (throwing up), rinse your mouth with water. Take a break for 15-30 minutes, and then keep drinking the solution.  Some people find it helpful to suck on a wedge of lime or lemon. You may also try sucking on hard candy (not red or purple) or washing your mouth out with water, clear soda or mouthwash.   If you followed your doctor s orders and your stool is clear or yellow liquid, then you are ready for the exam.  If you are not sure if your colon is clean, please call.      CLEAR LIQUID DIET  Water, tea, coffee (no cream)  Soda pop, Gatorade (not red or purple)  Clear nutrition drinks (Enlive, Resource Breeze)  Jell-O, Popsicles (no milk or fruit pieces) or sorbet (not red or purple)  Fat-Free soup broth or bouillon  Plain hard candy, such as clear lifesavers (not red or  purple)  Clear juices and fruit-flavored drinks such as apple juice, white grape juice, Hi-C and Rayo-Aid (not red or purple)          3. After we get these results we will decide if you need to see an oncologist.      Follow up in 4 months or sooner if needed        Call with questions.    For questions regarding your care Monday through Friday, contact the RN GI care coordinator,  Call Syeda Dodarius at  [phone] option 3 and leave a voicemail. Your call will be  returned same day, or if consultation is needed with the provider, it may be following business day - or you may send a My Chart message.    For medication refills (prescribed by the GI clinic), contact your pharmacy.    For appointment rescheduling/cancellation, contact [phone]     After hours, or if you have an immediate GI concern and cannot wait for a return call, contact the GI Fellow at [phone] and select option #4.            Follow-ups after your visit        Your next 10 appointments already scheduled     Mar 03, 2017  4:00 PM CST   New Visit with Evan Boston MD   [facility]    [address] [place] [address] [phone]            Mar 13, 2017   Procedure with Chavez Mirza MD   [facility] [place], Endoscopy ([facility])    [address] [address] [phone]           The [facility] is located on the corner of [address] and [address] on the [address]. It is easily accessible from virtually any point in the [place] area, via [address] and [address].            Jul 10, 2017 10:00 AM CDT   (Arrive by 9:45 AM)   Return Visit with Ej Goodman MD   [facility] Gastroenterology and IBD ([facility])    [address] [facility] [address] [phone]              Future tests that were ordered for you  "today     Open Future Orders        Priority Expected Expires Ordered    VIDEO CAPSULE ENDOSCOPY Routine  4/14/2017 2/28/2017            Who to contact     Please call your clinic at [phone] to:    Ask questions about your health    Make or cancel appointments    Discuss your medicines    Learn about your test results    Speak to your doctor   If you have compliments or concerns about an experience at your clinic, or if you wish to file a complaint, please contact [facility] Patient Relations at [phone] or email us at [email or web address]         Additional Information About Your Visit        [employer] Information     [employer] gives you secure access to your electronic health record. If you see a primary care provider, you can also send messages to your care team and make appointments. If you have questions, please call your primary care clinic.  If you do not have a primary care provider, please call [phone] and they will assist you.      [employer] is an electronic gateway that provides easy, online access to your medical records. With [employer], you can request a clinic appointment, read your test results, renew a prescription or communicate with your care team.     To access your existing account, please contact your [facility] Clinic or call [phone] for assistance.        Care EveryWhere ID     This is your Care EveryWhere ID. This could be used by other organizations to access your [facility] medical records  [id number]        Your Vitals Were     Pulse Height Pulse Oximetry BMI (Body Mass Index)          75 1.803 m (5' 11\") 100% 33.89 kg/m2         Blood Pressure from Last 3 Encounters:   02/28/17 127/76   01/26/17 113/69   01/19/17 121/80    Weight from Last 3 Encounters:   02/28/17 110.2 kg (243 lb)   01/19/17 109.8 kg (242 lb)   07/15/16 107.5 kg (237 lb)              We Performed the Following     Bld morphology pathology review     " CBC with platelets differential     Comprehensive metabolic panel     CRP inflammation     DNA double stranded antibodies     EMMANUEL antibody panel     Erythrocyte sedimentation rate auto     Ferritin     Folate     IgA     Immunoglobulins A/E/G/M  Serum ([facility])     Iron and iron binding capacity     Lipase     M Tuberculosis by Quantiferon     Nuclear Antibody CINDY by IFA IgG     Rheumatoid factor     Tissue transglutaminase roland IgA and IgG     TSH     Vitamin A     Vitamin B12     Vitamin D Deficiency     Vitamin E          Today's Medication Changes          These changes are accurate as of: 2/28/17 10:54 AM.  If you have any questions, ask your nurse or doctor.               Stop taking these medicines if you haven't already. Please contact your care team if you have questions.     omeprazole 20 MG CR capsule   Commonly known as:  priLOSEC   Stopped by:  Ej Goodman MD           polyethylene glycol powder   Commonly known as:  MIRALAX/GLYCOLAX   Stopped by:  Ej Goodman MD                    Primary Care Provider Office Phone # Fax #    Gadiel Gamez [phone]       [facility] [address]  [place] [address]        Thank you!     Thank you for choosing [facility]OLOG[facility]  for your care. Our goal is always to provide you with excellent care. Hearing back from our patients is one way we can continue to improve our services. Please take a few minutes to complete the written survey that you may receive in the mail after your visit with us. Thank you!             Your Updated Medication List - Protect others around you: Learn how to safely use, store and throw away your medicines at www.disposemymeds.org.          This list is accurate as of: 2/28/17 10:54 AM.  Always use your most recent med list.                   Brand Name Dispense Instructions for use    lisinopril 10 MG tablet    PRINIVIL/ZESTRIL    90 tablet    Take 1 tablet (10 mg)  by mouth daily

## 2017-03-01 ENCOUNTER — CARE COORDINATION (OUTPATIENT)
Dept: GASTROENTEROLOGY | Facility: CLINIC | Age: 32
End: 2017-03-01

## 2017-03-01 ENCOUNTER — PRE VISIT (OUTPATIENT)
Dept: NURSING | Facility: CLINIC | Age: 32
End: 2017-03-01

## 2017-03-01 LAB
COPATH REPORT: NORMAL
M TB TUBERC IFN-G BLD QL: ABNORMAL
M TB TUBERC IFN-G/MITOGEN IGNF BLD: ABNORMAL IU/ML

## 2017-03-01 RX ORDER — ERGOCALCIFEROL 1.25 MG/1
50000 CAPSULE, LIQUID FILLED ORAL
Qty: 8 CAPSULE | Refills: 0 | Status: SHIPPED | OUTPATIENT
Start: 2017-03-01 | End: 2017-04-20

## 2017-03-01 NOTE — NURSING NOTE
Printed after visit summary given to patient.  Video capsule scheduled.  Follow up appt. Patient sent to lab

## 2017-03-01 NOTE — PROGRESS NOTES
Called patient to let him know that he is to take vitamin d booster and that Dr. Goodman prescribed and to take one tablet every 7 days for 8 weeks then to take over the counter vitamin D 1000 units daily.  Will also send via my chart message to pt.     Can you contact pt and let him know to take vitamin D booster weekly for 8 weeks. After that he should take OTC vitamin D 1000 units daily     Thanks!

## 2017-03-01 NOTE — TELEPHONE ENCOUNTER
PREVISIT INFORMATION                                                    Jean Cross scheduled for future visit at [facility] specialty clinics.    Patient is scheduled to see Ludy on 3/3/17  Reason for visit: PSVT  Referring provider Dr Gadiel bruno  Has patient seen previous specialist? No  Medical Records:  Available in chart.  Patient was previously seen at a [facility] or [facility] facility.    REVIEW                                                      New patient packet mailed to patient: N/A  Medication reconciliation complete: Yes      Current Outpatient Prescriptions   Medication Sig Dispense Refill     lisinopril (PRINIVIL,ZESTRIL) 10 MG tablet Take 1 tablet (10 mg) by mouth daily 90 tablet 2     vitamin D (ERGOCALCIFEROL) 12166 UNIT capsule Take 1 capsule (50,000 Units) by mouth every 7 days for 8 doses (Patient not taking: Reported on 3/1/2017) 8 capsule 0       Allergies: Review of patient's allergies indicates no known allergies.        PLAN/FOLLOW-UP NEEDED                                                      Previsit review complete.  Patient will see provider at future scheduled appointment.     Patient Reminders Given:  Clinic location reviewed..  If you need to cancel or reschedule,please call [phone].    Radha Dias

## 2017-03-02 LAB
DSDNA AB SER-ACNC: NORMAL IU/ML
NUCLEAR IGG TITR SER IF: ABNORMAL {TITER}
TTG IGA SER-ACNC: 1 U/ML
TTG IGG SER-ACNC: NORMAL U/ML

## 2017-03-03 ENCOUNTER — RADIANT APPOINTMENT (OUTPATIENT)
Dept: GENERAL RADIOLOGY | Facility: CLINIC | Age: 32
End: 2017-03-03
Attending: INTERNAL MEDICINE
Payer: COMMERCIAL

## 2017-03-03 ENCOUNTER — OFFICE VISIT (OUTPATIENT)
Dept: CARDIOLOGY | Facility: CLINIC | Age: 32
End: 2017-03-03
Attending: FAMILY MEDICINE
Payer: COMMERCIAL

## 2017-03-03 VITALS
OXYGEN SATURATION: 99 % | DIASTOLIC BLOOD PRESSURE: 78 MMHG | SYSTOLIC BLOOD PRESSURE: 129 MMHG | HEART RATE: 90 BPM | WEIGHT: 239.8 LBS | BODY MASS INDEX: 33.57 KG/M2 | HEIGHT: 71 IN

## 2017-03-03 DIAGNOSIS — I88.0 MESENTERIC LYMPHADENITIS: ICD-10-CM

## 2017-03-03 DIAGNOSIS — A15.0 TB (PULMONARY TUBERCULOSIS): Primary | ICD-10-CM

## 2017-03-03 DIAGNOSIS — A15.0 TB (PULMONARY TUBERCULOSIS): ICD-10-CM

## 2017-03-03 LAB
A-TOCOPHEROL VIT E SERPL-MCNC: 8
ANNOTATION COMMENT IMP: NORMAL
BETA+GAMMA TOCOPHEROL SERPL-MCNC: 1.9 MG/DL
RETINYL PALMITATE SERPL-MCNC: 0.03 UG/ML
VIT A SERPL-MCNC: 0.42 UG/ML

## 2017-03-03 PROCEDURE — 71020 XR CHEST 2 VW: CPT | Performed by: RADIOLOGY

## 2017-03-03 PROCEDURE — 99204 OFFICE O/P NEW MOD 45 MIN: CPT | Mod: GC | Performed by: INTERNAL MEDICINE

## 2017-03-03 ASSESSMENT — PAIN SCALES - GENERAL: PAINLEVEL: NO PAIN (0)

## 2017-03-03 NOTE — NURSING NOTE
"Jean Cross's goals for this visit include:   Chief Complaint   Patient presents with     Consult     Paroxysmal Supraventricular Tachycardia/Palpitations       He requests these members of at his care team be copied on today's visit: NO    Initial Vitals: /78 (BP Location: Left arm, Cuff Size: Adult Regular)  Pulse 90  Ht 1.803 m (5' 11\")  Wt 108.8 kg (239 lb 12.8 oz)  SpO2 99%  BMI 33.45 kg/m2 Estimated body mass index is 33.45 kg/(m^2) as calculated from the following:    Height as of this encounter: 1.803 m (5' 11\").    Weight as of this encounter: 108.8 kg (239 lb 12.8 oz).. BP completed using cuff size :regular  PCP: Gadiel Gamez    Referring Provider  Gadiel Gamez MD  [facility]  [address]  [place] [facility] [place] [address]    Do you need refills at today's visit? HOA Licona CMA    "

## 2017-03-03 NOTE — PROGRESS NOTES
[facility] Cardiology Consultation:    Assessment and Plan: 32M presenting with short runs of paroxysmal atrial tachycardia. Patient has a structurally normal heart. This is a benign rhythm and does not need medical therapy unless patient's symptoms dictate. He has no high risk features such as syncope or pre-syncope and no sustained arrhythmias for longer than 30 seconds.     Plan:  1. Paroxysmal atrial tachycardia, normal LV fxn: Avoid caffeine and alcohol. Offered symptomatic rx with metoprolol, he is not interested.   2. HTN: controlled with lisinopril.   3. Obesity: Encouraged patient to begin regular aerobic exercise 20-30 minutes a day, 4-5 times per week and follow a mediterranean style diet including 5 servings each of fruits and vegetables every day.    F/U PRN, can consider metoprolol if necessary      Talon Moreno  CV Fellow    [facility] Cardiovascular Division    I have seen and examined the patient, reviewed labs and tests. I have discussed my findings and treatment recommendations with the house staff and/or Cardiology fellow and agree with their assessment and plan as outlined in the note.    Evan Boston MD    Cardiac Imaging and Prevention  [facility]  Pager: [phone]    HPI: Patient referred by Gadiel Gamez due to history of palpitations. He describes severe palpitations that have been frequently occurring (daily to weekly) without pre-syncope or syncope. He wore a 24 hour ziopatch holter monitor which revealed paroxsymal atrial tachycardia (short runs), with a PAC burden of 1%. Denies any chest pain or pressure, shortness of breath/dyspnea, orthopnea, pnd, syncope/presyncope or edema. No Fhx of SCD or arrhythmias.   Stress echo done earlier this year is normal, with normal LV fxn.   Basic labs are normal. FLP is normal.     Since last year, he has been under-evaluation for mesenteric LAD. He currently follows in GI with Dr. Goodman.   "    EXAM:  /78 (BP Location: Left arm, Cuff Size: Adult Regular)  Pulse 90  Ht 1.803 m (5' 11\")  Wt 108.8 kg (239 lb 12.8 oz)  SpO2 99%  BMI 33.45 kg/m2  General: NAD, AAx3  HEENT: Externally normal, sclera clear  CV: regular rhythm, s1, s2, no murmurs or rubs  Lungs: CTAB, non-labored breathing, no wheezing, no crackles  Abd: soft, non-distended, non-tender  Ext: no edema  Skin: no rashes or concerning lesions noted  Neuro: grossly non-focal  Psych: mood/affect appropriate        PAST MEDICAL HISTORY:  Past Medical History   Diagnosis Date     HTN (hypertension)        CURRENT MEDICATIONS:  Current Outpatient Prescriptions   Medication     vitamin D (ERGOCALCIFEROL) 41616 UNIT capsule     lisinopril (PRINIVIL,ZESTRIL) 10 MG tablet     No current facility-administered medications for this visit.        PAST SURGICAL HISTORY:  Past Surgical History   Procedure Laterality Date     Combined esophagoscopy, gastroscopy, duodenoscopy (egd) with co2 insufflation N/A 6/29/2016     Procedure: COMBINED ESOPHAGOSCOPY, GASTROSCOPY, DUODENOSCOPY (EGD) WITH CO2 INSUFFLATION;  Surgeon: Duane, William Charles, MD;  Location: [facility] OR     Esophagoscopy, gastroscopy, duodenoscopy (egd), combined N/A 6/29/2016     Procedure: COMBINED ESOPHAGOSCOPY, GASTROSCOPY, DUODENOSCOPY (EGD), BIOPSY SINGLE OR MULTIPLE;  Surgeon: Duane, William Charles, MD;  Location: [facility] OR     Colonoscopy with co2 insufflation N/A 8/5/2016     Procedure: COLONOSCOPY WITH CO2 INSUFFLATION;  Surgeon: Duane, William Charles, MD;  Location: [facility] OR       ALLERGIES   No Known Allergies    FAMILY HISTORY:  Family History   Problem Relation Age of Onset     Hypertension Mother      Hypertension Father      DIABETES Father      CEREBROVASCULAR DISEASE Maternal Grandmother      CANCER Maternal Grandfather      throat cancer     Arthritis Paternal Grandmother        SOCIAL HISTORY:  Social History     Social History     Marital status: Unknown     Spouse name: N/A     " Number of children: N/A     Years of education: N/A     Occupational History      [employer]     Social History Main Topics     Smoking status: Never Smoker     Smokeless tobacco: Never Used     Alcohol use Yes      Comment: 2-3 drinks every month      Drug use: No     Sexual activity: Yes     Partners: Female     Birth control/ protection: Condom     Other Topics Concern     Parent/Sibling W/ Cabg, Mi Or Angioplasty Before 65f 55m? No     Social History Narrative       ROS:   Constitutional: No fever, chills, or sweats. No weight gain/loss   ENT: No visual disturbance, ear ache, epistaxis, sore throat  Allergies/Immunologic: Negative.   Respiratory: No cough, hemoptysia  Cardiovascular: As per HPI  GI: No nausea, vomiting, hematemesis, melena, or hematochezia  : No urinary frequency, dysuria, or hematuria  Integument: Negative  Psychiatric: Negative  Neuro: Negative  Endocrinology: Negative   Musculoskeletal: Negative    ADDITIONAL COMMENTS:     I reviewed the patient's medications:     I reviewed the patient's pertinent clinical laboratory studies:     I reviewed the patient's pertinent imaging studies:   I reviewed the patient's ECG:

## 2017-03-03 NOTE — MR AVS SNAPSHOT
After Visit Summary   3/3/2017    Jean Cross    MRN: [id number]           Patient Information     Date Of Birth          1985        Visit Information        Provider Department      3/3/2017 4:00 PM Evan Boston MD [facility]        Care Instructions      The following is a summary of your office visit:    Medications started today:none    Medications stopped today:none    Medication dose change: none    Nurse contact information: Radha Dias RN  Cardiology Care Coordinator  [phone] Phone  [phone] Fax    Appointments made today: none    Patient instructions:Call Radha if you would like to try the beta blocker      If you have had any blood work, imaging or other testing completed we will be in touch within 1-2 weeks regarding the results. If you have any questions, concerns or need to schedule a follow up, please contact us at [phone]. If you are needing refills please contact your pharmacy. For urgent after hour care please call the [place] [facility] at [phone] or the [facility] at [phone] and ask to speak to the cardiologist on call.    It was a pleasure meeting with you today. Please let us know if there is anything else we can do for you so that we can be sure you are leaving completely satisfied with your care experience.     Your Cardiology Team at [facility]  RN Care Coordinator: Radha Lemon                  Follow-ups after your visit        Your next 10 appointments already scheduled     Mar 13, 2017   Procedure with Chavez Mirza MD   [facility], [place], [facility] ([facility], [facility])    [address] [id number] [phone]           The [facility] is located on the corner of [address] and [address] on the [facility]. It is easily accessible from virtually any point  in the [place] area, via [address] and [address].            Jul 10, 2017 10:00 AM CDT   (Arrive by 9:45 AM)   Return Visit with Ej Goodman MD   [facility] ([facility] [place])    [address] [address] [phone]              Who to contact     If you have questions or need follow up information about today's clinic visit or your schedule please contact [facility] directly at [phone].  Normal or non-critical lab and imaging results will be communicated to you by Cherrishhart, letter or phone within 4 business days after the clinic has received the results. If you do not hear from us within 7 days, please contact the clinic through Cherrishhart or phone. If you have a critical or abnormal lab result, we will notify you by phone as soon as possible.  Submit refill requests through [employer] or call your pharmacy and they will forward the refill request to us. Please allow 3 business days for your refill to be completed.          Additional Information About Your Visit        MyChart Information     [employer] gives you secure access to your electronic health record. If you see a primary care provider, you can also send messages to your care team and make appointments. If you have questions, please call your primary care clinic.  If you do not have a primary care provider, please call [phone] and they will assist you.      [employer] is an electronic gateway that provides easy, online access to your medical records. With [employer], you can request a clinic appointment, read your test results, renew a prescription or communicate with your care team.     To access your existing account, please contact your [facility] or call [phone] for assistance.        Care EveryWhere ID     This is your Care EveryWhere ID. This could be used by other organizations to access your [facility]  "records  [id number]        Your Vitals Were     Pulse Height Pulse Oximetry BMI (Body Mass Index)          90 1.803 m (5' 11\") 99% 33.45 kg/m2         Blood Pressure from Last 3 Encounters:   03/03/17 129/78   02/28/17 127/76   01/26/17 113/69    Weight from Last 3 Encounters:   03/03/17 108.8 kg (239 lb 12.8 oz)   02/28/17 110.2 kg (243 lb)   01/19/17 109.8 kg (242 lb)              Today, you had the following     No orders found for display       Primary Care Provider Office Phone # Fax #    Gadiel Gamez [phone]       [facility] [address] [facility] [address]        Thank you!     Thank you for choosing [facility]  for your care. Our goal is always to provide you with excellent care. Hearing back from our patients is one way we can continue to improve our services. Please take a few minutes to complete the written survey that you may receive in the mail after your visit with us. Thank you!             Your Updated Medication List - Protect others around you: Learn how to safely use, store and throw away your medicines at www.disposemymeds.org.          This list is accurate as of: 3/3/17  4:43 PM.  Always use your most recent med list.                   Brand Name Dispense Instructions for use    lisinopril 10 MG tablet    PRINIVIL/ZESTRIL    90 tablet    Take 1 tablet (10 mg) by mouth daily       vitamin D 45568 UNIT capsule    ERGOCALCIFEROL    8 capsule    Take 1 capsule (50,000 Units) by mouth every 7 days for 8 doses         "

## 2017-03-03 NOTE — PATIENT INSTRUCTIONS
The following is a summary of your office visit:    Medications started today:none    Medications stopped today:none    Medication dose change: none    Nurse contact information: Radha Dias RN  Cardiology Care Coordinator  [phone] Phone  [phone] Fax    Appointments made today: none    Patient instructions:Call Radha if you would like to try the beta blocker      If you have had any blood work, imaging or other testing completed we will be in touch within 1-2 weeks regarding the results. If you have any questions, concerns or need to schedule a follow up, please contact us at [phone]. If you are needing refills please contact your pharmacy. For urgent after hour care please call the [place] [facility] at [phone] or the [facility] at [phone] and ask to speak to the cardiologist on call.    It was a pleasure meeting with you today. Please let us know if there is anything else we can do for you so that we can be sure you are leaving completely satisfied with your care experience.     Your Cardiology Team at [facility]  RN Care Coordinator: Radha CARSON: Xochilt

## 2017-03-12 ENCOUNTER — TELEPHONE (OUTPATIENT)
Dept: NURSING | Facility: CLINIC | Age: 32
End: 2017-03-12

## 2017-03-12 NOTE — TELEPHONE ENCOUNTER
Call Type: Triage Call    Presenting Problem: Patient calls and states he is scheduled for a  video EGD tomorrow.  Today he has a fever, cough and sneezing,  Taking Ibuprofen. Does not feel he should have the test tomorrow.  Advised to call office first thing in the morning to see about  rescheduling.  He states understanding.  Triage Note:  Guideline Title: Information Only Call; No Symptom Triage (Adult)  Recommended Disposition: Call Provider When Office is Open  Original Inclination: Wanted to speak with a nurse  Override Disposition:  Intended Action: Follow advice given  Physician Contacted: No  Requesting information and provider is best resource; no triage required. ?  YES  Requesting regular office appointment ? NO  Sign(s) or symptom(s) associated with a diagnosed condition or with a new illness  ? NO  Requesting information about provider, services or community resources ? NO  Call back to complete assessment/clarification of information from prior caller to  complete triage ? NO  Physician Instructions:  Care Advice:

## 2017-03-27 ENCOUNTER — HOSPITAL ENCOUNTER (OUTPATIENT)
Facility: CLINIC | Age: 32
Discharge: HOME | End: 2017-03-27
Attending: INTERNAL MEDICINE | Admitting: INTERNAL MEDICINE
Payer: COMMERCIAL

## 2017-03-27 ENCOUNTER — SURGERY (OUTPATIENT)
Age: 32
End: 2017-03-27

## 2017-03-27 PROCEDURE — 91110 GI TRC IMG INTRAL ESOPH-ILE: CPT | Performed by: INTERNAL MEDICINE

## 2017-03-27 NOTE — OR NURSING
Video capsule swallowed @ 10:57 Am with out incident. Patient understands all instructions and will return equipment tomorrow.

## 2017-03-30 ENCOUNTER — OFFICE VISIT (OUTPATIENT)
Dept: INFECTIOUS DISEASES | Facility: CLINIC | Age: 32
End: 2017-03-30
Payer: COMMERCIAL

## 2017-03-30 VITALS
SYSTOLIC BLOOD PRESSURE: 125 MMHG | HEART RATE: 92 BPM | HEIGHT: 71 IN | WEIGHT: 242.1 LBS | TEMPERATURE: 98.4 F | DIASTOLIC BLOOD PRESSURE: 79 MMHG | BODY MASS INDEX: 33.89 KG/M2

## 2017-03-30 DIAGNOSIS — I88.0 MESENTERIC LYMPHADENITIS: ICD-10-CM

## 2017-03-30 PROBLEM — Z22.7 LATENT TUBERCULOSIS BY BLOOD TEST: Status: ACTIVE | Noted: 2017-03-03

## 2017-03-30 PROCEDURE — 99213 OFFICE O/P EST LOW 20 MIN: CPT | Mod: ZF

## 2017-03-30 ASSESSMENT — PAIN SCALES - GENERAL: PAINLEVEL: NO PAIN (0)

## 2017-03-30 NOTE — PROGRESS NOTES
"    INFECTIOUS DISEASE CLINIC    REASON FOR VISIT:   Positive Quantiferon Gold IGRA    REFERRED BY:  Evan Boston    HISTORY OF PRESENT ILLNESS:   Jean is a pleasant 33 y/o gentleman with HTN and paroxysmal SVT and recent GI symptoms (now resolved).  As part of his GI work-up, a Quantiferon Gold IGRA was ordered and found to be positive.    Jean was born and raised in [place] in the region of [facility].  He lives here in [place] with his nemesio wife, 14 month old boy, and his parents.  His father does have history of treated pulmonary tuberculosis, and no active symptoms.  Jean was not evaluated for TB in the past to his knowledge.  He does not recall any significant pulmonary infections in his past.  He works in IT.  He does not have any cough.  No fevers, chills, or night sweats and no weight loss.    Regarding his GI symptoms, Jean recalls that they started last year in 4/2016.  He lost his appetite and noted some bloating along with some reflux and burning sensation.  Despite not eating as much, his weight stayed stable. He had some left sided abdominal discomfort and tenderness on palpation.  He had an EGD performed at the time and this revealed a normal esophagus, stomach, and small intestine.  H. Pylori stool antigen and biopsies of the stomach were negative.  He was given a trial of a PPI, which he thinks helped with his symptoms.  A colonoscopy showed so significant diverticular disease, with a normal colon and ileum.  He was given a course of antibiotics for possible diverticulitis.    A CT scan of the abdomen/pelvis was first performed 6/29/2016 and showed \"minimal fat stranding surrounding several mesenteric lymph nodes in  the left mid abdomen which is nonspecific but can be seen in mesenteric lymphadenitis, inflammatory bowel disease, or other inflammatory/infectious process.\"   Due to the nonspecific nature of the findings, the plan was to repeat the CT scan in 6 months.      Jean continued " "to improve.  He also started Metamucil which helped with some constipation.  He now has one normal bowel movement each day, and the reflux, burning, and abdominal pain has all resolved.  He reports a good appetite.  He does occasionally still feel bloated, and he thinks this is brought on by milk, so he avoids this.     CT scan was repeat 1/20/2017 and showed \"persistent mesenteric nonspecific lymphadenopathy and inflammation as well as retroperitoneal inflammation. A differential includes inflammatory bowel disease, sclerosing mesenteritis, mesenteric lymphadenitis, lymphatic disruption.\"     With these results, GI chose to send further work up, which included the Quantiferon Gold IGRA.  Jean was also scheduled for a capsule endoscopy, with results still pending at the time of this visit.      PAST MEDICAL HISTORY:    Past Medical History:   Diagnosis Date     HTN (hypertension)    Paroxysmal SVT    PAST SURGICAL HISTORY:  Past Surgical History:   Procedure Laterality Date     COLONOSCOPY WITH CO2 INSUFFLATION N/A 8/5/2016    Procedure: COLONOSCOPY WITH CO2 INSUFFLATION;  Surgeon: Duane, William Charles, MD;  Location: [facility] OR     COMBINED ESOPHAGOSCOPY, GASTROSCOPY, DUODENOSCOPY (EGD) WITH CO2 INSUFFLATION N/A 6/29/2016    Procedure: COMBINED ESOPHAGOSCOPY, GASTROSCOPY, DUODENOSCOPY (EGD) WITH CO2 INSUFFLATION;  Surgeon: Duane, William Charles, MD;  Location: [facility] OR     ESOPHAGOSCOPY, GASTROSCOPY, DUODENOSCOPY (EGD), COMBINED N/A 6/29/2016    Procedure: COMBINED ESOPHAGOSCOPY, GASTROSCOPY, DUODENOSCOPY (EGD), BIOPSY SINGLE OR MULTIPLE;  Surgeon: Duane, William Charles, MD;  Location: [facility] OR     HC CAPSULE ENDOSCOPY N/A 3/27/2017    Procedure: CAPSULE/PILL CAM ENDOSCOPY;  Surgeon: Chavez Mirza MD;  Location:  GI     FAMILY PAST MEDICAL HISTORY:  Cousin diagnosed with lymphoma in her 20s.  Father had pulmonary tuberculosis, which was treated.    SOCIAL HISTORY:  Social History     Social History     Marital " "status: Unknown     Spouse name: N/A     Number of children: N/A     Years of education: N/A     Occupational History      [employer]     Social History Main Topics     Smoking status: Never Smoker     Smokeless tobacco: Never Used     Alcohol use Yes      Comment: 2-3 drinks every month      Drug use: No     Sexual activity: Yes     Partners: Female     Birth control/ protection: Condom     Other Topics Concern     Parent/Sibling W/ Cabg, Mi Or Angioplasty Before 65f 55m? No     Social History Narrative     Lives with wife, 14 month old son, and parents.  Works in IT.  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  She works as a research coordinator for a Neurology group that specializes in MS research.  Enjoys an occasional alcoholic drink 2-3 times per month, denies tobacco or illicit drug use.    CURRENT MEDICATIONS:    Current Outpatient Prescriptions   Medication     lisinopril (PRINIVIL,ZESTRIL) 10 MG tablet     No current facility-administered medications for this visit.      ALLERGIES:  NKDA    REVIEW OF SYSTEMS: A full 12-point review of systems was obtained, pertinent positives and negatives as above.     PHYSICAL EXAMINATION:    VITAL SIGNS: /79  Pulse 92  Temp 98.4  F (36.9  C) (Oral)  Ht 1.803 m (5' 11\")  Wt 109.8 kg (242 lb 1.6 oz)  BMI 33.77 kg/m2  GENERAL:   No acute distress    HEENT: No icterus or injection. Oropharynx moist and clear without lesions or exudate.    NECK: Supple and nontender  LYMPH:  No cervical, axillary or inguinal lymphadenopathy  LUNGS: Clear to ausculation bilaterally without any increased work of breathing  HEART: Regular rate and rhythm and no murmur, gallop or rub    ABDOMEN: Normoactive bowel sounds, soft, nontender, nondistended, no hepatosplenomegaly.    EXTREMITIES: Warm and well perfused without clubbing, " cyanosis, or edema  SKIN:  No rashes  NEURO:  Awake, alert, no focal neurologic deficits.  PSYCH: Affect normal. Speech fluent and appropriate.     LABORATORY DATA:    Office Visit on 02/28/2017   Component Date Value Ref Range Status     TSH 02/28/2017 0.84  0.40 - 4.00 mU/L Final     M Tuberculosis Result 02/28/2017 * NEG Final     M Tuberculosis Antigen Value 02/28/2017   IU/mL Final     Vitamin A 02/28/2017 0.42   Final    Comment: Reference range: 0.30 to 1.20  Unit: mg/L       Retinol Palmitate 02/28/2017 0.03   Final    Comment: Reference range: 0.00 to 0.10  Unit: mg/L       Vitamin A Interp 02/28/2017    Final     Vitamin B12 02/28/2017 345  193 - 986 pg/mL Final     Folate 02/28/2017 17.2  >5.4 ng/mL Final     Vitamin D Deficiency screening 02/28/2017 9* 20 - 75 ug/L Final     Vitamin E 02/28/2017 8.0   Final     Vitamin E Gamma 02/28/2017 1.9   Final     Tissue Transglutaminase Antibody I* 02/28/2017 1  <7 U/mL Final     Tissue Transglutaminase Ana IgG 02/28/2017   <7 U/mL Final                    Value:<1  Negative       Lipase 02/28/2017 88  73 - 393 U/L Final     Iron 02/28/2017 108  35 - 180 ug/dL Final     Iron Binding Cap 02/28/2017 383  240 - 430 ug/dL Final     Iron Saturation Index 02/28/2017 28  15 - 46 % Final     IGA 02/28/2017 319  70 - 380 mg/dL Final     WBC 02/28/2017 7.8  4.0 - 11.0 10e9/L Final     RBC Count 02/28/2017 5.47  4.4 - 5.9 10e12/L Final     Hemoglobin 02/28/2017 15.4  13.3 - 17.7 g/dL Final     Hematocrit 02/28/2017 46.7  40.0 - 53.0 % Final     MCV 02/28/2017 85  78 - 100 fl Final     MCH 02/28/2017 28.2  26.5 - 33.0 pg Final     MCHC 02/28/2017 33.0  31.5 - 36.5 g/dL Final     RDW 02/28/2017 13.0  10.0 - 15.0 % Final     Platelet Count 02/28/2017 190  150 - 450 10e9/L Final     Diff Method 02/28/2017 Automated Method   Final     % Neutrophils 02/28/2017 39.9  % Final     % Lymphocytes 02/28/2017 50.0  % Final     % Monocytes 02/28/2017 7.1  % Final     % Eosinophils  02/28/2017 2.2  % Final     % Basophils 02/28/2017 0.4  % Final     % Immature Granulocytes 02/28/2017 0.4  % Final     Nucleated RBCs 02/28/2017 0  0 /100 Final     Absolute Neutrophil 02/28/2017 3.1  1.6 - 8.3 10e9/L Final     Absolute Lymphocytes 02/28/2017 3.9  0.8 - 5.3 10e9/L Final     Absolute Monocytes 02/28/2017 0.6  0.0 - 1.3 10e9/L Final     Absolute Eosinophils 02/28/2017 0.2  0.0 - 0.7 10e9/L Final     Absolute Basophils 02/28/2017 0.0  0.0 - 0.2 10e9/L Final     Abs Immature Granulocytes 02/28/2017 0.0  0 - 0.4 10e9/L Final     Absolute Nucleated RBC 02/28/2017 0.0   Final     Sodium 02/28/2017 142  133 - 144 mmol/L Final     Potassium 02/28/2017 3.9  3.4 - 5.3 mmol/L Final     Chloride 02/28/2017 105  94 - 109 mmol/L Final     Carbon Dioxide 02/28/2017 26  20 - 32 mmol/L Final     Anion Gap 02/28/2017 11  3 - 14 mmol/L Final     Glucose 02/28/2017 91  70 - 99 mg/dL Final     Urea Nitrogen 02/28/2017 13  7 - 30 mg/dL Final     Creatinine 02/28/2017 0.91  0.66 - 1.25 mg/dL Final     GFR Estimate 02/28/2017   >60 mL/min/1.7m2 Final                    Value:>90  Non  GFR Calc       GFR Estimate If Black 02/28/2017   >60 mL/min/1.7m2 Final                    Value:>90   GFR Calc       Calcium 02/28/2017 8.7  8.5 - 10.1 mg/dL Final     Bilirubin Total 02/28/2017 0.5  0.2 - 1.3 mg/dL Final     Albumin 02/28/2017 4.0  3.4 - 5.0 g/dL Final     Protein Total 02/28/2017 7.6  6.8 - 8.8 g/dL Final     Alkaline Phosphatase 02/28/2017 107  40 - 150 U/L Final     ALT 02/28/2017 52  0 - 70 U/L Final     AST 02/28/2017 24  0 - 45 U/L Final     CRP Inflammation 02/28/2017 3.2  0.0 - 8.0 mg/L Final     Sed Rate 02/28/2017 7  0 - 15 mm/h Final     Ferritin 02/28/2017 19* 26 - 388 ng/mL Final     RNP Antibody IgG 02/28/2017   0.0 - 0.9 AI Final     Ruff EMMANUEL Antibody IgG 02/28/2017   0.0 - 0.9 AI Final     SSA (Ro) (EMMANUEL) Antibody, IgG 02/28/2017   0.0 - 0.9 AI Final     SSB (La) (EMMANUEL)  Antibody, IgG 02/28/2017   0.0 - 0.9 AI Final     Scleroderma Antibody Scl-70 EMMANUEL IgG 02/28/2017   0.0 - 0.9 AI Final     CINDY by IFA IgG 02/28/2017 *  Final     DNA-ds 02/28/2017   <10 IU/mL Final     Rheumatoid Factor 02/28/2017 <20  <20 IU/mL Final     Copath Report 02/28/2017    Final   TEST(S):  Blood Smear Morphology    FINAL DIAGNOSIS:  Peripheral Blood Smear:  -Normal hemogram and differential  -No definitive atypical lymphocyte population identified, see comment    COMMENT:  Circulating neoplastic cells are not always seen in lymphoma; therefore  this peripheral smear review does rule out lymphoma. Clinical  correlation is recommended.          % Retic 02/28/2017 1.3  0.5 - 2.0 % Final     Absolute Retic 02/28/2017 68.6  25 - 95 10e9/L Final       Imaging:  CTs as described above.    3/2017 Chest XR:  Clear lungs    ASSESSMENT AND PLAN:   Jean Cross is a 31 y/o male with HTN and paroxysmal SVT, with recent GI symptoms (now resolved) with persistent mesenteric nonspecific lymphadenopathy and inflammation on CT for at least 6 months.  His work up revealed a positive Quantiferon Gold IGRA.    Jean's father was treated for pulmonary tuberculosis, and Jean lived in an endemic area in [place] for much of his life.  He does not have any pulmonary symptoms, and his chest XR is clear from 3/2017.  This may represent latent tuberculosis.  However, the mesenteric lymphadenopathy could also be consistent with tuberculosis.  We would be hesitant to treat for latent tuberculosis while this remains an important diagnostic consideration.  Latent TB is treated with 1-2 drugs (rather than beginning with 4 drugs as with active TB treatment).  LTBI treatment could lead to resistant tuberculosis if he actually has active infection.    Jean also has a family history of lymphoma, and this is in the differential as well.    We recommend biopsy of the mesenteric lymphadenopathy with cultures and PCR for MTB as well as  histopathology.   We will work to coordinate with GI regarding the best approach, either with Interventional Radiology or EUS.     Patient was seen and discussed with the Infectious Disease attending and clinic preceptor, Dr. Dulce Maria Watson.      Derik Steele MD    Fellow in Adult and Pediatric Infectious Disease    pager: [phone]    UPDATE:     Interventional Radiology did not see a good window to the mesenteric lymphadenopathy and found the areas to be too small for their approach.  They did not think they could obtain adequate tissue for diagnosis.   Likewise, it was felt that EUS was unlikely to obtain adequate tissue.  Exploratory laparotomy was recommended as the ideal approach to obtain enough tissue.    Jean and his wife were reluctant to pursue surgery, understandably so.  He is currently asymptomatic.  IF he does have active TB, it is extrapulmonary and not contagious.  Imaging has not changed in 6 months time.    In consultation with Dr. Goodman of GI, the patient, and his wife, we agreed to a watchful waiting approach.  Jean will inform us if he has any return of GI symptoms or any new symptoms.  He will watch for fevers, chills, sweats, or weight loss.  If any of this occurs, we will reconsider the exploratory laparotomy.  If he remains asymptomatic, we will repeat the CT of the abdomen/pelvis in 6 months and determine next steps thereafter.    Jean and his wife have my contact information, and are encouraged to reach out with any concerns or questions.    Derik Steele MD    Fellow in Adult and Pediatric Infectious Disease    pager: [phone]    Attending Attestation:  I evaluated Mr. Cross with fellow Dr. Steele.  I have reviewed today's labs, vitals and imaging results. This note reflects our joint findings, assessment and recommendations.    Dulce Maria Watson MD  [phone]

## 2017-03-30 NOTE — MR AVS SNAPSHOT
After Visit Summary   3/30/2017    Jean Cross    MRN: [id number]           Patient Information     Date Of Birth          1985        Visit Information        Provider Department      3/30/2017 3:00 PM Derik Steele MD [facility] and Infectious Diseases        Today's Diagnoses     TB (pulmonary tuberculosis)        Mesenteric lymphadenitis          Care Instructions      Thank you for coming today to discuss your positive Quantiferon Gold TB test.      Our next step is to schedule a biopsy.  Dr. Goodman and I will work to decide if an upper endoscopic biopsy OR an interventional radiology biopsy is best.      We will do our best to schedule this before you start your new job on 4/10/2017.      Please call us in the interim if you have any questions.      My work number is [phone] which you can call or text during business hours 8am-5pm and Monday - Friday.      Derik Steele MD  Fellow, Infectious Disease    and    Dulce Maria Watson MD  Senior attending, Infectious Disease              Follow-ups after your visit        Your next 10 appointments already scheduled     Jul 10, 2017 10:00 AM CDT   (Arrive by 9:45 AM)   Return Visit with Ej Goodman MD   [facility] Gastroenterology and IBD ([facility] Clinics [facility])    [address] [place] [address]  4th [facility] [address] [phone]              Who to contact     If you have questions or need follow up information about today's clinic visit or your schedule please contact [facility] AND INFECTIOUS DISEASES directly at [phone].  Normal or non-critical lab and imaging results will be communicated to you by MyChart, letter or phone within 4 business days after the clinic has received the results. If you do not hear from us within 7 days, please contact the clinic through MyChart or phone. If you have a critical or abnormal lab result, we will notify you by phone as soon  "as possible.  Submit refill requests through [employer] or call your pharmacy and they will forward the refill request to us. Please allow 3 business days for your refill to be completed.          Additional Information About Your Visit        [employer] Information     [employer] gives you secure access to your electronic health record. If you see a primary care provider, you can also send messages to your care team and make appointments. If you have questions, please call your primary care clinic.  If you do not have a primary care provider, please call [phone] and they will assist you.        Care EveryWhere ID     This is your Care EveryWhere ID. This could be used by other organizations to access your [place] medical records  [id number]        Your Vitals Were     Pulse Temperature Height BMI (Body Mass Index)          92 98.4  F (36.9  C) (Oral) 1.803 m (5' 11\") 33.77 kg/m2         Blood Pressure from Last 3 Encounters:   03/30/17 125/79   03/03/17 129/78   02/28/17 127/76    Weight from Last 3 Encounters:   03/30/17 109.8 kg (242 lb 1.6 oz)   03/03/17 108.8 kg (239 lb 12.8 oz)   02/28/17 110.2 kg (243 lb)              Today, you had the following     No orders found for display       Primary Care Provider Office Phone # Fax #    Gadiel Gamez [phone]       [facility] [address]  [place] [address]        Thank you!     Thank you for choosing [facility] AND INFECTIOUS DISEASES  for your care. Our goal is always to provide you with excellent care. Hearing back from our patients is one way we can continue to improve our services. Please take a few minutes to complete the written survey that you may receive in the mail after your visit with us. Thank you!             Your Updated Medication List - Protect others around you: Learn how to safely use, store and throw away your medicines at www.disposemymeds.org.          This list is accurate as of: 3/30/17  3:53 " PM.  Always use your most recent med list.                   Brand Name Dispense Instructions for use    lisinopril 10 MG tablet    PRINIVIL/ZESTRIL    90 tablet    Take 1 tablet (10 mg) by mouth daily       vitamin D 53281 UNIT capsule    ERGOCALCIFEROL    8 capsule    Take 1 capsule (50,000 Units) by mouth every 7 days for 8 doses

## 2017-03-30 NOTE — NURSING NOTE
"Chief Complaint   Patient presents with     New Patient     Latent TB       Initial /79  Pulse 92  Temp 98.4  F (36.9  C) (Oral)  Ht 1.803 m (5' 11\")  Wt 109.8 kg (242 lb 1.6 oz)  BMI 33.77 kg/m2 Estimated body mass index is 33.77 kg/(m^2) as calculated from the following:    Height as of this encounter: 1.803 m (5' 11\").    Weight as of this encounter: 109.8 kg (242 lb 1.6 oz).  Medication Reconciliation: complete  "

## 2017-03-30 NOTE — PATIENT INSTRUCTIONS
Thank you for coming today to discuss your positive Quantiferon Gold TB test.      Our next step is to schedule a biopsy.  Dr. Goodman and I will work to decide if an upper endoscopic biopsy OR an interventional radiology biopsy is best.      We will do our best to schedule this before you start your new job on 4/10/2017.      Please call us in the interim if you have any questions.      My work number is [phone] which you can call or text during business hours 8am-5pm and Monday - Friday.      Derik Steele MD  Fellow, Infectious Disease    and    Dulce Maria Watson MD  Senior attending, Infectious Disease

## 2017-04-06 LAB — VIDEO CAPSULE ENDOSCOPY: NORMAL

## 2017-04-07 ENCOUNTER — CARE COORDINATION (OUTPATIENT)
Dept: GASTROENTEROLOGY | Facility: CLINIC | Age: 32
End: 2017-04-07

## 2017-04-07 NOTE — PROGRESS NOTES
Contacted patient in regards to mychart request.     Patient called requesting update on plan for lymph node biopsy. Per patient, Dr. Steele was going to discuss plans with you and patient is wondering if there are any updates.    Will route to Dr. Goodman

## 2017-04-13 ENCOUNTER — CARE COORDINATION (OUTPATIENT)
Dept: GASTROENTEROLOGY | Facility: CLINIC | Age: 32
End: 2017-04-13

## 2017-04-13 NOTE — PROGRESS NOTES
Discussed plan with the patient and his wife.    Given gurmeet mesentery and mild LAD may be difficult to get adequate samples without full ex-lap. So far evaluation negative including EGD, colonoscopy and capsule endoscopy. Labs reassuring though + tb quant gold noted.    He remains completely asymptomatic.    I think it is reasonable to follow up in another 6 months with repeat imaging. If findings persistent or progressed then can reconsider ex lap for nodes. He and his wife agree to this.    Recommend follow up with myself and ID in 6 months with repeat CT.    Ej Goodman MD    [facility]  Division of Gastroenterology, Hepatology and Nutrition

## 2017-04-14 ENCOUNTER — TELEPHONE (OUTPATIENT)
Dept: FAMILY MEDICINE | Facility: CLINIC | Age: 32
End: 2017-04-14

## 2017-04-14 ENCOUNTER — MYC MEDICAL ADVICE (OUTPATIENT)
Dept: FAMILY MEDICINE | Facility: CLINIC | Age: 32
End: 2017-04-14

## 2017-04-14 DIAGNOSIS — M54.12 CERVICAL RADICULOPATHY: Primary | ICD-10-CM

## 2017-04-14 RX ORDER — PREDNISONE 20 MG/1
TABLET ORAL
Qty: 20 TABLET | Refills: 0 | Status: SHIPPED | OUTPATIENT
Start: 2017-04-14 | End: 2017-06-19

## 2017-04-14 NOTE — TELEPHONE ENCOUNTER
Reason for Call:  Medication or medication refill:    Do you use a [facility]?  Name of the pharmacy and phone number for the current request:  [facility] [address] [place] [address]    Name of the medication requested:     Other request: Patient is seeing a Chiropractor for inflammation on back of neck and asking for mediation. Patient declined making appointment.    Can we leave a detailed message on this number? YES    Phone number patient can be reached at: Cell number on file:    Telephone Information:   Mobile [phone]       Best Time: any    Call taken on 4/14/2017 at 9:38 AM by Jacqueline Woods

## 2017-04-14 NOTE — TELEPHONE ENCOUNTER
Duplicate encounter - below [employer] message was sent to primary provider to review:    Tatum Gamez,     I am seeing Dr. Sherman Linares at [facility] in [place] for my neck pain which is radiating to my left arm. It is because of a pinched nerve and i have lot of swelling. For chiro to be successful he asked me to ask you to write a prednisone medication. He wants to me to take the one full dose pack to bring the inflamation down. If you have any questions please call him at [phone]. He is in the office till 11:00 AM today. Please let me know if you have any questions about this.     Best Regards!   Jean Cross   [phone]     Routing to provider as FYI regarding [employer] message.  Nan Steele RN

## 2017-04-17 ENCOUNTER — CARE COORDINATION (OUTPATIENT)
Dept: GASTROENTEROLOGY | Facility: CLINIC | Age: 32
End: 2017-04-17

## 2017-04-17 DIAGNOSIS — I88.0 MESENTERIC LYMPHADENITIS: Primary | ICD-10-CM

## 2017-04-19 NOTE — PROGRESS NOTES
Rescheduled for  6 months from last appt. Pt aware that he needs to schedule ct prior to clinic appt.  Repeat ct order in and sent number to schedule via my chart.      needs follow up appt with me in 6 months. Needs repeat CT abd/pelvis prior to visit.

## 2017-05-22 ENCOUNTER — MYC MEDICAL ADVICE (OUTPATIENT)
Dept: FAMILY MEDICINE | Facility: CLINIC | Age: 32
End: 2017-05-22

## 2017-05-23 ENCOUNTER — MYC MEDICAL ADVICE (OUTPATIENT)
Dept: FAMILY MEDICINE | Facility: CLINIC | Age: 32
End: 2017-05-23

## 2017-06-01 ENCOUNTER — MYC MEDICAL ADVICE (OUTPATIENT)
Dept: FAMILY MEDICINE | Facility: CLINIC | Age: 32
End: 2017-06-01

## 2017-06-01 ENCOUNTER — MYC REFILL (OUTPATIENT)
Dept: FAMILY MEDICINE | Facility: CLINIC | Age: 32
End: 2017-06-01

## 2017-06-01 DIAGNOSIS — I10 HYPERTENSION GOAL BP (BLOOD PRESSURE) < 140/90: ICD-10-CM

## 2017-06-01 RX ORDER — LISINOPRIL 10 MG/1
10 TABLET ORAL DAILY
Qty: 90 TABLET | Refills: 2 | Status: CANCELLED | OUTPATIENT
Start: 2017-06-01

## 2017-06-01 NOTE — TELEPHONE ENCOUNTER
lisinopril (PRINIVIL,ZESTRIL) 10 MG tablet      Last Written Prescription Date: 2/1/16  Last Fill Quantity: 90, # refills: 2  Last Office Visit with G, P or [facility] prescribing provider: 1/19/17       Potassium   Date Value Ref Range Status   02/28/2017 3.9 3.4 - 5.3 mmol/L Final     Creatinine   Date Value Ref Range Status   02/28/2017 0.91 0.66 - 1.25 mg/dL Final     BP Readings from Last 3 Encounters:   03/30/17 125/79   03/03/17 129/78   02/28/17 127/76           Ravi Faarax  [facility]

## 2017-06-01 NOTE — TELEPHONE ENCOUNTER
[facility]  Original authorizing provider: Gadiel Gamez MD, MD Jean Cross would like a refill of the following medications:  lisinopril (PRINIVIL,ZESTRIL) 10 MG tablet [Gadiel Gamez MD, MD]    Preferred pharmacy: [facility] [address] [place] [address] - [address] [place] [address]    Comment:  Dr Gamez, I am out of Lisinopril and haven't taken it for 3 days and starting to feel the effect. Can you please prescribe the med and I will pick it up from [facility] on my way from work. I already called them and they will fax you regarding the same. Regards, Jean

## 2017-06-06 ENCOUNTER — CARE COORDINATION (OUTPATIENT)
Dept: GASTROENTEROLOGY | Facility: CLINIC | Age: 32
End: 2017-06-06

## 2017-06-06 NOTE — PROGRESS NOTES
Wife of pt called saying that pt's insurance stating that the video capsule is experimental and will not pay for the test.  Cost to pt is 3400 dollars. Pt given the number to [employer] billing to check if coded correctly and also to ask the insurance to send a letter why test was not paid for.  Will ask Dr. Goodman to write a letter why test was needed.   Wife will keep us posted.

## 2017-06-19 ENCOUNTER — OFFICE VISIT (OUTPATIENT)
Dept: FAMILY MEDICINE | Facility: CLINIC | Age: 32
End: 2017-06-19
Payer: COMMERCIAL

## 2017-06-19 VITALS
TEMPERATURE: 98.5 F | WEIGHT: 238.9 LBS | DIASTOLIC BLOOD PRESSURE: 80 MMHG | BODY MASS INDEX: 33.32 KG/M2 | HEART RATE: 83 BPM | OXYGEN SATURATION: 98 % | SYSTOLIC BLOOD PRESSURE: 126 MMHG

## 2017-06-19 DIAGNOSIS — L91.8 INFLAMED SKIN TAG: ICD-10-CM

## 2017-06-19 DIAGNOSIS — L91.8 SKIN TAG: Primary | ICD-10-CM

## 2017-06-19 PROCEDURE — 99207 ZZC NO CHARGE LOS: CPT | Performed by: FAMILY MEDICINE

## 2017-06-19 PROCEDURE — 11200 RMVL SKIN TAGS UP TO&INC 15: CPT | Performed by: FAMILY MEDICINE

## 2017-06-19 NOTE — PROGRESS NOTES
SUBJECTIVE:                                                    Jean Cross is a 32 year old male who presents to clinic today for the following health issues:      Pt here today to have skin tags removed. Skin tags under the armpits, groin area, and neck. Groin area skin tags are painful. Pt using OTC lidocaine cream to numb the area and has it covered with a band aid. Skin tags have been getting caught on clothing causing increased pain.         Problem list and histories reviewed & adjusted, as indicated.  Additional history: as documented    BP Readings from Last 3 Encounters:   06/19/17 126/80   03/30/17 125/79   03/03/17 129/78    Wt Readings from Last 3 Encounters:   06/19/17 108.4 kg (238 lb 14.4 oz)   03/30/17 109.8 kg (242 lb 1.6 oz)   03/03/17 108.8 kg (239 lb 12.8 oz)                    Reviewed and updated as needed this visit by clinical staff  Allergies  Meds       Reviewed and updated as needed this visit by Provider  Tobacco  Allergies  Meds  Med Hx  Surg Hx  Fam Hx  Soc Hx        ROS:  Constitutional, HEENT, cardiovascular, pulmonary, gi and gu systems are negative, except as otherwise noted.    OBJECTIVE:     /80 (BP Location: Right arm, Patient Position: Sitting, Cuff Size: Adult Large)  Pulse 83  Temp 98.5  F (36.9  C) (Oral)  Wt 108.4 kg (238 lb 14.4 oz)  SpO2 98%  BMI 33.32 kg/m2  Body mass index is 33.32 kg/(m^2).  GENERAL: healthy, alert and no distress  RESP: lungs clear to auscultation - no rales, rhonchi or wheezes  CV: regular rate and rhythm, normal S1 S2, no S3 or S4, no murmur, click or rub, no peripheral edema and peripheral pulses strong  SKIN: scattered skin tags right neck -2, right axilla- 4, left axilla - 3, right inner thigh - 1.  Left inner thigh with tag like lesion that is erythematous, swollen and very tender to touch.      Consent obtained from patient and after cleaning with alchohol and using a sharp iris scissors, 10 typical skin tag(s) was/were  removed without complication.    Attention then placed on the inflamed tag like lesion:      Subjective: Jean Cross a 32 year old male who presents today for lesion removal. The lesion(s) is/are located on the left inner thigh, number 1 and measures 0.3cm He The patient reports the lesion is painful, swollen and denies other significant symptoms on ROS. Medications reviewed.    Pause for the cause has been completed prior to the prceedure.   1. Jean was identified by both name and date of birth   2. The correct site was identified   3. Site was marked by provider    4. Written informed consent correct and signed or verbal authorization  to proceed was obtained   5. Verifed necessary supplies, equipment, and diagnostics are available    6. Time out was performed immediately prior to procedure    Objective: The lesion(s) is/are of the above mentioned size and location and is/are erythematous and edematous. The area was prepped and appropriately anesthetized. Using the usual technique, shave excision, electrocautery of the base of lesion was performed. An appropriate dressing was applied. The procedure was well tolerated and without complications.     Assessment: inflamed tag    Plan: Follow up: The patient may return prn.. Wound care instructions provided.  Patient was instructed to be alert for any signs of cutaneous infection.

## 2017-06-19 NOTE — NURSING NOTE
"Chief Complaint   Patient presents with     Skin Tags       Initial /80 (BP Location: Right arm, Patient Position: Sitting, Cuff Size: Adult Large)  Pulse 83  Temp 98.5  F (36.9  C) (Oral)  Wt 108.4 kg (238 lb 14.4 oz)  SpO2 98%  BMI 33.32 kg/m2 Estimated body mass index is 33.32 kg/(m^2) as calculated from the following:    Height as of 3/30/17: 1.803 m (5' 11\").    Weight as of this encounter: 108.4 kg (238 lb 14.4 oz).  Medication Reconciliation: complete       Soco Pacheco CMA      "

## 2017-06-19 NOTE — MR AVS SNAPSHOT
After Visit Summary   6/19/2017    Jean Cross    MRN: [id number]           Patient Information     Date Of Birth          1985        Visit Information        Provider Department      6/19/2017 6:20 PM Maricruz Blunt MD [facility]        Care Instructions    Apply antibiotic ointment to areas twice a day  Bandage if rubbing on clothing.  Otherwise open to the air is recommended.              Follow-ups after your visit        Your next 10 appointments already scheduled     Oct 09, 2017  7:30 AM CDT   (Arrive by 7:15 AM)   Return Visit with Ej Goodman MD   [facility] Gastroenterology and IBD ([facility] and [facility] [place])    [address] [facility]  4th [facility] [address] [phone]              Who to contact     If you have questions or need follow up information about today's clinic visit or your schedule please contact [facility] directly at [phone].  Normal or non-critical lab and imaging results will be communicated to you by MyChart, letter or phone within 4 business days after the clinic has received the results. If you do not hear from us within 7 days, please contact the clinic through [employer] or phone. If you have a critical or abnormal lab result, we will notify you by phone as soon as possible.  Submit refill requests through [employer] or call your pharmacy and they will forward the refill request to us. Please allow 3 business days for your refill to be completed.          Additional Information About Your Visit        MyChart Information     [employer] gives you secure access to your electronic health record. If you see a primary care provider, you can also send messages to your care team and make appointments. If you have questions, please call your primary care clinic.  If you do not have a primary care provider, please call [phone] and they will assist you.        Care EveryWhere ID     This is your Care  EveryWhere ID. This could be used by other organizations to access your [place] medical records  [id number]        Your Vitals Were     Pulse Temperature Pulse Oximetry BMI (Body Mass Index)          83 98.5  F (36.9  C) (Oral) 98% 33.32 kg/m2         Blood Pressure from Last 3 Encounters:   06/19/17 126/80   03/30/17 125/79   03/03/17 129/78    Weight from Last 3 Encounters:   06/19/17 108.4 kg (238 lb 14.4 oz)   03/30/17 109.8 kg (242 lb 1.6 oz)   03/03/17 108.8 kg (239 lb 12.8 oz)              Today, you had the following     No orders found for display       Primary Care Provider Office Phone # Fax #    Gadiel Gamez [phone]       [facility] [address] [facility] [address]        Thank you!     Thank you for choosing [facility]  for your care. Our goal is always to provide you with excellent care. Hearing back from our patients is one way we can continue to improve our services. Please take a few minutes to complete the written survey that you may receive in the mail after your visit with us. Thank you!             Your Updated Medication List - Protect others around you: Learn how to safely use, store and throw away your medicines at www.disposemymeds.org.          This list is accurate as of: 6/19/17  7:30 PM.  Always use your most recent med list.                   Brand Name Dispense Instructions for use    lisinopril 10 MG tablet    PRINIVIL/ZESTRIL    90 tablet    TAKE ONE TABLET (10 MG) BY MOUTH ONCE DAILY

## 2017-06-20 NOTE — PATIENT INSTRUCTIONS
Apply antibiotic ointment to areas twice a day  Bandage if rubbing on clothing.  Otherwise open to the air is recommended.

## 2017-07-12 ENCOUNTER — CARE COORDINATION (OUTPATIENT)
Dept: GASTROENTEROLOGY | Facility: CLINIC | Age: 32
End: 2017-07-12

## 2017-07-12 NOTE — PROGRESS NOTES
Wife of pt called to say that she had called the billing department a couple of weeks ago  to make sure her 's video capsule was coded correctly. She received a call back yesterday that it was coded correctly.  Pt's wife called the insurance company and was told it was experimental procedure.  Dr. Goodman had sent pt a letter to use for insurance back on June 6.  Did not receive.  Changed address and sent again today via my chart and postal service. Wife will submit to insurance and keep us posted.

## 2017-09-05 ENCOUNTER — CARE COORDINATION (OUTPATIENT)
Dept: GASTROENTEROLOGY | Facility: CLINIC | Age: 32
End: 2017-09-05

## 2017-10-02 ENCOUNTER — RADIANT APPOINTMENT (OUTPATIENT)
Dept: CT IMAGING | Facility: CLINIC | Age: 32
End: 2017-10-02
Attending: INTERNAL MEDICINE
Payer: COMMERCIAL

## 2017-10-02 DIAGNOSIS — I88.0 MESENTERIC LYMPHADENITIS: ICD-10-CM

## 2017-10-02 PROCEDURE — 74177 CT ABD & PELVIS W/CONTRAST: CPT | Performed by: STUDENT IN AN ORGANIZED HEALTH CARE EDUCATION/TRAINING PROGRAM

## 2017-10-02 RX ORDER — IOPAMIDOL 755 MG/ML
135 INJECTION, SOLUTION INTRAVASCULAR ONCE
Status: COMPLETED | OUTPATIENT
Start: 2017-10-02 | End: 2017-10-02

## 2017-10-02 RX ADMIN — IOPAMIDOL 135 ML: 755 INJECTION, SOLUTION INTRAVASCULAR at 15:37

## 2017-12-03 DIAGNOSIS — I10 HYPERTENSION GOAL BP (BLOOD PRESSURE) < 140/90: ICD-10-CM

## 2017-12-03 NOTE — TELEPHONE ENCOUNTER
Requested Prescriptions   Pending Prescriptions Disp Refills     lisinopril (PRINIVIL/ZESTRIL) 10 MG tablet [Pharmacy Med Name: LISINOPRIL 10MG  TAB] 90 tablet 1    Last Written Prescription Date:  6/1/17  Last Fill Quantity: 90,  # refills: 1   Last Office Visit with [facility], [facility] or [facility] prescribing provider:  1/19/2017     Future Office Visit:      Sig: TAKE ONE TABLET BY MOUTH ONCE DAILY    ACE Inhibitors (Including Combos) Protocol Passed    12/3/2017  8:33 AM       Passed - Blood pressure under 140/90    BP Readings from Last 3 Encounters:   06/19/17 126/80   03/30/17 125/79   03/03/17 129/78                Passed - Recent or future visit with authorizing provider's specialty    Patient had office visit in the last year or has a visit in the next 30 days with authorizing provider.  See chart review.              Passed - Patient is age 18 or older       Passed - Normal serum creatinine on file in past 12 months    Recent Labs   Lab Test  02/28/17   1139   CR  0.91            Passed - Normal serum potassium on file in past 12 months    Recent Labs   Lab Test  02/28/17   1139   POTASSIUM  3.9

## 2017-12-06 RX ORDER — LISINOPRIL 10 MG/1
TABLET ORAL
Qty: 90 TABLET | Refills: 1 | Status: SHIPPED | OUTPATIENT
Start: 2017-12-06 | End: 2018-05-23

## 2018-01-03 ENCOUNTER — MYC MEDICAL ADVICE (OUTPATIENT)
Dept: FAMILY MEDICINE | Facility: CLINIC | Age: 33
End: 2018-01-03

## 2018-01-03 DIAGNOSIS — R68.89 FLU-LIKE SYMPTOMS: Primary | ICD-10-CM

## 2018-01-03 RX ORDER — OSELTAMIVIR PHOSPHATE 75 MG/1
75 CAPSULE ORAL 2 TIMES DAILY
Qty: 10 CAPSULE | Refills: 0 | Status: SHIPPED | OUTPATIENT
Start: 2018-01-03 | End: 2018-05-23

## 2018-05-23 ENCOUNTER — RADIANT APPOINTMENT (OUTPATIENT)
Dept: GENERAL RADIOLOGY | Facility: CLINIC | Age: 33
End: 2018-05-23
Attending: FAMILY MEDICINE
Payer: COMMERCIAL

## 2018-05-23 ENCOUNTER — OFFICE VISIT (OUTPATIENT)
Dept: FAMILY MEDICINE | Facility: CLINIC | Age: 33
End: 2018-05-23
Payer: COMMERCIAL

## 2018-05-23 VITALS
RESPIRATION RATE: 16 BRPM | TEMPERATURE: 98.6 F | OXYGEN SATURATION: 96 % | SYSTOLIC BLOOD PRESSURE: 122 MMHG | WEIGHT: 238 LBS | DIASTOLIC BLOOD PRESSURE: 81 MMHG | HEIGHT: 70 IN | HEART RATE: 83 BPM | BODY MASS INDEX: 34.07 KG/M2

## 2018-05-23 DIAGNOSIS — Z13.6 CARDIOVASCULAR SCREENING; LDL GOAL LESS THAN 160: ICD-10-CM

## 2018-05-23 DIAGNOSIS — M79.671 RIGHT FOOT PAIN: ICD-10-CM

## 2018-05-23 DIAGNOSIS — Z13.1 SCREENING FOR DIABETES MELLITUS: ICD-10-CM

## 2018-05-23 DIAGNOSIS — Z00.00 ROUTINE HISTORY AND PHYSICAL EXAMINATION OF ADULT: Primary | ICD-10-CM

## 2018-05-23 DIAGNOSIS — Z11.4 SCREENING FOR HIV (HUMAN IMMUNODEFICIENCY VIRUS): ICD-10-CM

## 2018-05-23 DIAGNOSIS — I10 ESSENTIAL HYPERTENSION WITH GOAL BLOOD PRESSURE LESS THAN 140/90: ICD-10-CM

## 2018-05-23 LAB
ALBUMIN SERPL-MCNC: 3.8 G/DL (ref 3.4–5)
ALP SERPL-CCNC: 120 U/L (ref 40–150)
ALT SERPL W P-5'-P-CCNC: 51 U/L (ref 0–70)
ANION GAP SERPL CALCULATED.3IONS-SCNC: 7 MMOL/L (ref 3–14)
AST SERPL W P-5'-P-CCNC: 28 U/L (ref 0–45)
BILIRUB SERPL-MCNC: 0.5 MG/DL (ref 0.2–1.3)
BUN SERPL-MCNC: 10 MG/DL (ref 7–30)
CALCIUM SERPL-MCNC: 9.1 MG/DL (ref 8.5–10.1)
CHLORIDE SERPL-SCNC: 105 MMOL/L (ref 94–109)
CHOLEST SERPL-MCNC: 170 MG/DL
CO2 SERPL-SCNC: 27 MMOL/L (ref 20–32)
CREAT SERPL-MCNC: 0.96 MG/DL (ref 0.66–1.25)
CREAT UR-MCNC: 32 MG/DL
GFR SERPL CREATININE-BSD FRML MDRD: 89 ML/MIN/1.7M2
GLUCOSE SERPL-MCNC: 98 MG/DL (ref 70–99)
HDLC SERPL-MCNC: 52 MG/DL
HIV 1+2 AB+HIV1 P24 AG SERPL QL IA: NONREACTIVE
LDLC SERPL CALC-MCNC: 97 MG/DL
MICROALBUMIN UR-MCNC: <5 MG/L
MICROALBUMIN/CREAT UR: NORMAL MG/G CR (ref 0–17)
NONHDLC SERPL-MCNC: 118 MG/DL
POTASSIUM SERPL-SCNC: 4.5 MMOL/L (ref 3.4–5.3)
PROT SERPL-MCNC: 7.5 G/DL (ref 6.8–8.8)
SODIUM SERPL-SCNC: 139 MMOL/L (ref 133–144)
TRIGL SERPL-MCNC: 107 MG/DL

## 2018-05-23 PROCEDURE — 36415 COLL VENOUS BLD VENIPUNCTURE: CPT | Performed by: FAMILY MEDICINE

## 2018-05-23 PROCEDURE — 73630 X-RAY EXAM OF FOOT: CPT | Mod: RT

## 2018-05-23 PROCEDURE — 80061 LIPID PANEL: CPT | Performed by: FAMILY MEDICINE

## 2018-05-23 PROCEDURE — 87389 HIV-1 AG W/HIV-1&-2 AB AG IA: CPT | Performed by: FAMILY MEDICINE

## 2018-05-23 PROCEDURE — 82043 UR ALBUMIN QUANTITATIVE: CPT | Performed by: FAMILY MEDICINE

## 2018-05-23 PROCEDURE — 80053 COMPREHEN METABOLIC PANEL: CPT | Performed by: FAMILY MEDICINE

## 2018-05-23 PROCEDURE — 99395 PREV VISIT EST AGE 18-39: CPT | Performed by: FAMILY MEDICINE

## 2018-05-23 RX ORDER — LISINOPRIL 10 MG/1
10 TABLET ORAL DAILY
Qty: 90 TABLET | Refills: 3 | Status: CANCELLED | OUTPATIENT
Start: 2018-05-23

## 2018-05-23 RX ORDER — LOSARTAN POTASSIUM 25 MG/1
25 TABLET ORAL DAILY
Qty: 30 TABLET | Refills: 1 | Status: SHIPPED | OUTPATIENT
Start: 2018-05-23 | End: 2018-07-25

## 2018-05-23 ASSESSMENT — PAIN SCALES - GENERAL: PAINLEVEL: SEVERE PAIN (6)

## 2018-05-23 NOTE — PATIENT INSTRUCTIONS
Preventive Health Recommendations  Male Ages 26 - 39    Yearly exam:             See your health care provider every year in order to  o   Review health changes.   o   Discuss preventive care.    o   Review your medicines if your doctor has prescribed any.    You should be tested each year for STDs (sexually transmitted diseases), if you re at risk.     After age 35, talk to your provider about cholesterol testing. If you are at risk for heart disease, have your cholesterol tested at least every 5 years.     If you are at risk for diabetes, you should have a diabetes test (fasting glucose).  Shots: Get a flu shot each year. Get a tetanus shot every 10 years.     Nutrition:    Eat at least 5 servings of fruits and vegetables daily.     Eat whole-grain bread, whole-wheat pasta and brown rice instead of white grains and rice.     Talk to your provider about Calcium and Vitamin D.     Lifestyle    Exercise for at least 150 minutes a week (30 minutes a day, 5 days a week). This will help you control your weight and prevent disease.     Limit alcohol to one drink per day.     No smoking.     Wear sunscreen to prevent skin cancer.     See your dentist every six months for an exam and cleaning.     At [facility], we strive to deliver an exceptional experience to you, every time we see you.  If you receive a survey in the mail, please send us back your thoughts. We really do value your feedback.    Based on your medical history, these are the current health maintenance/preventive care services that you are due for (some may have been done at this visit.)  Health Maintenance Due   Topic Date Due     HIV SCREEN (SYSTEM ASSIGNED)  01/05/2001     LIPID MONITORING Q1 YEAR  11/20/2016     ISRAEL QUESTIONNAIRE 1 YEAR  07/15/2017     PHQ-9 Q1YR  07/15/2017     BMP Q1 YR  02/28/2018         Suggested websites for health information:  Www.[facility].SocialSci : Up to date and easily searchable information on multiple  topics.  Www.medlineplus.gov : medication info, interactive tutorials, watch real surgeries online  Www.familydoctor.org : good info from the Academy of Family Physicians  Www.cdc.gov : public health info, travel advisories, epidemics (H1N1)  Www.aap.org : children's health info, normal development, vaccinations  Www.health.[facility].[place].us : [place] dept of health, public health issues in [place], N1N1    Your care team:                            Family Medicine Internal Medicine   MD Duglas Gresham MD Shantel Branch-Fleming, MD Katya Georgiev PA-C Nam Ho, MD Pediatrics   YASMEEN Rojas, KATTY Alex APRN MD Adelaide Hudson MD Deborah Mielke, MD Kim Thein, APRN CNP      Clinic hours: Monday - Thursday 7 am-7 pm; Fridays 7 am-5 pm.   Urgent care: Monday - Friday 11 am-9 pm; Saturday and Sunday 9 am-5 pm.  Pharmacy : Monday -Thursday 8 am-8 pm; Friday 8 am-6 pm; Saturday and Sunday 9 am-5 pm.     Clinic: [phone]   Pharmacy: [phone]

## 2018-05-23 NOTE — PROGRESS NOTES
SUBJECTIVE:   CC: Jean Cross is an 33 year old male who presents for preventative health visit.     Healthy Habits:    Do you get at least three servings of calcium containing foods daily (dairy, green leafy vegetables, etc.)? no, taking calcium and/or vitamin D supplement: no    Amount of exercise or daily activities, outside of work: 0 day(s) per week    Problems taking medications regularly No    Medication side effects: No    Have you had an eye exam in the past two years? no    Do you see a dentist twice per year? yes    Do you have sleep apnea, excessive snoring or daytime drowsiness?yes snoring       -------------------------------------    Today's PHQ-2 Score:   PHQ-2 ( 1999 [employer]) 5/23/2018 7/15/2016   Q1: Little interest or pleasure in doing things 0 0   Q2: Feeling down, depressed or hopeless 0 0   PHQ-2 Score 0 0   Q1: Little interest or pleasure in doing things - -   Q2: Feeling down, depressed or hopeless - -   PHQ-2 Score - -       Abuse: Current or Past(Physical, Sexual or Emotional)- No  Do you feel safe in your environment - Yes    Social History   Substance Use Topics     Smoking status: Never Smoker     Smokeless tobacco: Never Used     Alcohol use Yes      Comment: 2-3 drinks every month       If you drink alcohol do you typically have >3 drinks per day or >7 drinks per week? No                      Last PSA: No results found for: PSA    Reviewed orders with patient. Reviewed health maintenance and updated orders accordingly - Yes  Labs reviewed in EPIC    Reviewed and updated as needed this visit by clinical staff         Reviewed and updated as needed this visit by Provider            ROS:  CONSTITUTIONAL: NEGATIVE for fever, chills, change in weight  INTEGUMENTARY/SKIN: NEGATIVE for worrisome rashes, moles or lesions  EYES: NEGATIVE for vision changes or irritation  ENT: NEGATIVE for ear, mouth and throat problems  RESP: NEGATIVE for significant cough or SOB  CV: NEGATIVE for chest pain,  palpitations or peripheral edema  GI: NEGATIVE for nausea, abdominal pain, heartburn, or change in bowel habits   male: negative for dysuria, hematuria, decreased urinary stream, erectile dysfunction, urethral discharge  MUSCULOSKELETAL: NEGATIVE for significant arthralgias or myalgia  NEURO: NEGATIVE for weakness, dizziness or paresthesias  PSYCHIATRIC: NEGATIVE for changes in mood or affect    OBJECTIVE:   There were no vitals taken for this visit.  EXAM:  GENERAL: healthy, alert and no distress  NECK: no adenopathy, no asymmetry, masses, or scars and thyroid normal to palpation  RESP: lungs clear to auscultation - no rales, rhonchi or wheezes  CV: regular rate and rhythm, normal S1 S2, no S3 or S4, no murmur, click or rub, no peripheral edema and peripheral pulses strong  ABDOMEN: soft, nontender, no hepatosplenomegaly, no masses and bowel sounds normal  MS: no gross musculoskeletal defects noted, no edema    ASSESSMENT/PLAN:   1. Routine history and physical examination of adult  As below.    2. Essential hypertension with goal blood pressure less than 140/90  Controlled but possible cough due to lisinopril. D/c lisinopril. Start losartan. RTC in 1 month for recheck.  - Comprehensive metabolic panel (BMP + Alb, Alk Phos, ALT, AST, Total. Bili, TP)  - Albumin Random Urine Quantitative with Creat Ratio  - losartan (COZAAR) 25 MG tablet; Take 1 tablet (25 mg) by mouth daily For blood pressure.  Dispense: 30 tablet; Refill: 1    3. CARDIOVASCULAR SCREENING; LDL GOAL LESS THAN 160  Discussed low fat diet.  - Lipid panel reflex to direct LDL Fasting  - Comprehensive metabolic panel (BMP + Alb, Alk Phos, ALT, AST, Total. Bili, TP)    4. Screening for diabetes mellitus'    - Comprehensive metabolic panel (BMP + Alb, Alk Phos, ALT, AST, Total. Bili, TP)    5. Screening for HIV (human immunodeficiency virus)    - HIV Screening    6. Right foot pain  Metatarsalgia. Discussed stiffer shoes. Shoes indoors as well.  - XR  "Foot Right G/E 3 Views; Future    COUNSELING:  Reviewed preventive health counseling, as reflected in patient instructions       Regular exercise       Healthy diet/nutrition       Vision screening       reports that he has never smoked. He has never used smokeless tobacco.    Estimated body mass index is 33.32 kg/(m^2) as calculated from the following:    Height as of 3/30/17: 5' 11\" (1.803 m).    Weight as of 6/19/17: 238 lb 14.4 oz (108.4 kg).       Counseling Resources:  ATP IV Guidelines  Pooled Cohorts Equation Calculator  FRAX Risk Assessment  ICSI Preventive Guidelines  Dietary Guidelines for Americans, 2010  USDA's MyPlate  ASA Prophylaxis  Lung CA Screening    Gadiel Gamez MD, MD  [facility]  "

## 2018-07-25 ENCOUNTER — TELEPHONE (OUTPATIENT)
Dept: FAMILY MEDICINE | Facility: CLINIC | Age: 33
End: 2018-07-25

## 2018-07-25 DIAGNOSIS — I10 ESSENTIAL HYPERTENSION WITH GOAL BLOOD PRESSURE LESS THAN 140/90: ICD-10-CM

## 2018-07-25 NOTE — TELEPHONE ENCOUNTER
"Requested Prescriptions   Pending Prescriptions Disp Refills     losartan (COZAAR) 25 MG tablet [Pharmacy Med Name: LOSARTAN 25MG   TAB]    Last Written Prescription Date:  5/23/18  Last Fill Quantity: 30,  # refills: 1   Last Office Visit with [facility], P or [facility] prescribing provider:  5/23/18   Future Office Visit:      30 tablet 1     Sig: TAKE 1 TABLET BY MOUTH ONCE DAILY FOR BLOOD PRESSURE    Angiotensin-II Receptors Passed    7/25/2018  5:15 PM       Passed - Blood pressure under 140/90 in past 12 months    BP Readings from Last 3 Encounters:   05/23/18 122/81   06/19/17 126/80   03/30/17 125/79                Passed - Recent (12 mo) or future (30 days) visit within the authorizing provider's specialty    Patient had office visit in the last 12 months or has a visit in the next 30 days with authorizing provider or within the authorizing provider's specialty.  See \"Patient Info\" tab in inbasket, or \"Choose Columns\" in Meds & Orders section of the refill encounter.           Passed - Patient is age 18 or older       Passed - Normal serum creatinine on file in past 12 months    Recent Labs   Lab Test  05/23/18   0741   CR  0.96            Passed - Normal serum potassium on file in past 12 months    Recent Labs   Lab Test  05/23/18   0741   POTASSIUM  4.5                          Ravi Faarax  [facility]  "

## 2018-07-27 RX ORDER — LOSARTAN POTASSIUM 25 MG/1
TABLET ORAL
Qty: 30 TABLET | Refills: 6 | Status: SHIPPED | OUTPATIENT
Start: 2018-07-27 | End: 2019-02-24

## 2018-07-27 NOTE — TELEPHONE ENCOUNTER
...Reason for Call:  prescription    Detailed comments: Patient's wife called said if possible can the script for LOSARTAN be expedited due to the weekend patient is out.  Phone Number Patient can be reached at: Home number on file [phone] (home)    Best Time: anytime    Can we leave a detailed message on this number? YES    Call taken on 7/27/2018 at 9:23 AM by Vicky Osuna

## 2018-07-27 NOTE — TELEPHONE ENCOUNTER
Team please kindly call the patient and let them know their prescription was sent to the pharmacy. Document then close encounter please.    Prescription approved per [facility] Refill Protocol-Losartan    Kaleigh Mackenzie RN

## 2018-11-14 ENCOUNTER — OFFICE VISIT (OUTPATIENT)
Dept: FAMILY MEDICINE | Facility: CLINIC | Age: 33
End: 2018-11-14
Payer: COMMERCIAL

## 2018-11-14 VITALS
TEMPERATURE: 98.8 F | BODY MASS INDEX: 34.93 KG/M2 | HEIGHT: 70 IN | WEIGHT: 244 LBS | HEART RATE: 90 BPM | DIASTOLIC BLOOD PRESSURE: 83 MMHG | RESPIRATION RATE: 16 BRPM | OXYGEN SATURATION: 97 % | SYSTOLIC BLOOD PRESSURE: 120 MMHG

## 2018-11-14 DIAGNOSIS — M79.669 PAIN IN SHIN, UNSPECIFIED LATERALITY: ICD-10-CM

## 2018-11-14 DIAGNOSIS — M25.561 PAIN IN BOTH KNEES, UNSPECIFIED CHRONICITY: ICD-10-CM

## 2018-11-14 DIAGNOSIS — M72.2 PLANTAR FASCIITIS: Primary | ICD-10-CM

## 2018-11-14 DIAGNOSIS — M25.562 PAIN IN BOTH KNEES, UNSPECIFIED CHRONICITY: ICD-10-CM

## 2018-11-14 DIAGNOSIS — Z23 FLU VACCINE NEED: ICD-10-CM

## 2018-11-14 PROCEDURE — 99214 OFFICE O/P EST MOD 30 MIN: CPT | Mod: 25 | Performed by: FAMILY MEDICINE

## 2018-11-14 PROCEDURE — 90686 IIV4 VACC NO PRSV 0.5 ML IM: CPT | Performed by: FAMILY MEDICINE

## 2018-11-14 PROCEDURE — 90471 IMMUNIZATION ADMIN: CPT | Performed by: FAMILY MEDICINE

## 2018-11-14 RX ORDER — DICLOFENAC SODIUM 75 MG/1
75 TABLET, DELAYED RELEASE ORAL 2 TIMES DAILY PRN
Qty: 60 TABLET | Refills: 3 | Status: SHIPPED | OUTPATIENT
Start: 2018-11-14 | End: 2019-04-25

## 2018-11-14 ASSESSMENT — PATIENT HEALTH QUESTIONNAIRE - PHQ9: SUM OF ALL RESPONSES TO PHQ QUESTIONS 1-9: 2

## 2018-11-14 ASSESSMENT — PAIN SCALES - GENERAL: PAINLEVEL: NO PAIN (0)

## 2018-11-14 NOTE — NURSING NOTE
Injectable Influenza Immunization Documentation    1.  Has the patient received the information for the injectable influenza vaccine? YES     2. Is the patient 6 months of age or older? YES     3. Does the patient have any of the following contraindications?         Severe allergy to eggs? No     Severe allergic reaction to previous influenza vaccines? No   Severe allergy to latex? No       History of Guillain-Houston syndrome? No     Currently have a temperature greater than 100.4F? No    Prior to injection verified patient identity using patient's name and date of birth.  Due to injection administration, patient instructed to remain in clinic for 15 minutes  afterwards, and to report any adverse reaction to me immediately.       Vaccination given by Mayelin Vyas MA

## 2018-11-14 NOTE — MR AVS SNAPSHOT
After Visit Summary   11/14/2018    Jean Cross    MRN: [id number]           Patient Information     Date Of Birth          1985        Visit Information        Provider Department      11/14/2018 3:20 PM Gadiel Gamez MD [facility]        Today's Diagnoses     Plantar fasciitis    -  1    Pain in both knees, unspecified chronicity        Pain in shin, unspecified laterality        Flu vaccine need          Care Instructions    At [facility], we strive to deliver an exceptional experience to you, every time we see you.  If you receive a survey in the mail, please send us back your thoughts. We really do value your feedback.    Based on your medical history, these are the current health maintenance/preventive care services that you are due for (some may have been done at this visit.)  Health Maintenance Due   Topic Date Due     ISRAEL QUESTIONNAIRE 1 YEAR  07/15/2017     PHQ-9 Q1YR  07/15/2017     INFLUENZA VACCINE (1) 09/01/2018         Suggested websites for health information:  Www.[employer] : Up to date and easily searchable information on multiple topics.  Www.medlineplus.gov : medication info, interactive tutorials, watch real surgeries online  Www.familydoctor.org : good info from the Academy of Family Physicians  Www.cdc.gov : public health info, travel advisories, epidemics (H1N1)  Www.aap.org : children's health info, normal development, vaccinations  Www.health[place]state[place] : [place] dept of health, public health issues in [place], N1N1    Your care team:                            Family Medicine Internal Medicine   MD Duglas Gresham MD Shantel Branch-Fleming, MD Katya Georgiev PA-C Nam Ho, MD Pediatrics   YASMEEN Rojas, MD Adelaide Coffman CNP, MD Deborah Mielke, MD Kim Thein, APRN CNP      Clinic hours: Monday - Thursday 7 am-7 pm; Fridays 7 am-5 pm.   Urgent care:  Monday - Friday 11 am-9 pm; Saturday and Sunday 9 am-5 pm.  Pharmacy : Monday -Thursday 8 am-8 pm; Friday 8 am-6 pm; Saturday and Sunday 9 am-5 pm.     Clinic: [phone]   Pharmacy: [phone]            Follow-ups after your visit        Additional Services     ORTHO  REFERRAL       [facility] is referring you to the Orthopedic  Services at [facility].       The  Representative will assist you in the coordination of your Orthopedic and Musculoskeletal Care as prescribed by your physician.    The  Representative will call you within 1 business day to help schedule your appointment, or you may contact the  Representative at:    All areas ~ [phone]     Type of Referral : Non Surgical / Sport Medicine       Timeframe requested: Routine    Coverage of these services is subject to the terms and limitations of your health insurance plan.  Please call member services at your health plan with any benefit or coverage questions.      If X-rays, CT or MRI's have been performed, please contact the facility where they were done to arrange for , prior to your scheduled appointment.  Please bring this referral request to your appointment and present it to your specialist.                  Who to contact     If you have questions or need follow up information about today's clinic visit or your schedule please contact [facility] directly at [phone].  Normal or non-critical lab and imaging results will be communicated to you by MyChart, letter or phone within 4 business days after the clinic has received the results. If you do not hear from us within 7 days, please contact the clinic through MyChart or phone. If you have a critical or abnormal lab result, we will notify you by phone as soon as possible.  Submit refill requests through [employer] or call your pharmacy and they will forward the refill request  "to us. Please allow 3 business days for your refill to be completed.          Additional Information About Your Visit        [employer] Information     [employer] gives you secure access to your electronic health record. If you see a primary care provider, you can also send messages to your care team and make appointments. If you have questions, please call your primary care clinic.  If you do not have a primary care provider, please call [phone] and they will assist you.        Care EveryWhere ID     This is your Care EveryWhere ID. This could be used by other organizations to access your [facility] medical records  [id number]        Your Vitals Were     Pulse Temperature Respirations Height Pulse Oximetry BMI (Body Mass Index)    90 98.8  F (37.1  C) (Oral) 16 5' 10\" (1.778 m) 97% 35.01 kg/m2       Blood Pressure from Last 3 Encounters:   11/14/18 120/83   05/23/18 122/81   06/19/17 126/80    Weight from Last 3 Encounters:   11/14/18 244 lb (110.7 kg)   05/23/18 238 lb (108 kg)   06/19/17 238 lb 14.4 oz (108.4 kg)              We Performed the Following     ADMIN 1st VACCINE     FLU VAC QUAD SPLIT VIR, IM (3+ YRS)     ORTHO  REFERRAL          Today's Medication Changes          These changes are accurate as of 11/14/18  3:55 PM.  If you have any questions, ask your nurse or doctor.               Start taking these medicines.        Dose/Directions    diclofenac 75 MG EC tablet   Commonly known as:  VOLTAREN   Used for:  Plantar fasciitis, Pain in both knees, unspecified chronicity, Pain in shin, unspecified laterality   Started by:  Gadiel Gamez MD        Dose:  75 mg   Take 1 tablet (75 mg) by mouth 2 times daily as needed for moderate pain   Quantity:  60 tablet   Refills:  3            Where to get your medicines      These medications were sent to [facility] [address] [place] [id number]     Phone:  [phone]     diclofenac 75 MG EC " tablet                Primary Care Provider Office Phone # Fax #    Gadiel Gamez [phone]       [address]  [facility] [address]        Equal Access to Services     MICKI ANDERSEN : Hadii aad ku hadchacortao Soomaali, waaxda luqadaha, qaybta kaalmada adeegyada, waxomar pabloirais grewal. So [facility] [phone].    ATENCIÓN: Si habla español, tiene a ny disposición servicios gratuitos de asistencia lingüística. Llame al [phone].    We comply with applicable federal civil rights laws and [place] laws. We do not discriminate on the basis of race, color, national origin, age, disability, sex, sexual orientation, or gender identity.            Thank you!     Thank you for choosing [facility]  for your care. Our goal is always to provide you with excellent care. Hearing back from our patients is one way we can continue to improve our services. Please take a few minutes to complete the written survey that you may receive in the mail after your visit with us. Thank you!             Your Updated Medication List - Protect others around you: Learn how to safely use, store and throw away your medicines at www.disposemymeds.org.          This list is accurate as of 11/14/18  3:55 PM.  Always use your most recent med list.                   Brand Name Dispense Instructions for use Diagnosis    diclofenac 75 MG EC tablet    VOLTAREN    60 tablet    Take 1 tablet (75 mg) by mouth 2 times daily as needed for moderate pain    Plantar fasciitis, Pain in both knees, unspecified chronicity, Pain in shin, unspecified laterality       losartan 25 MG tablet    COZAAR    30 tablet    TAKE 1 TABLET BY MOUTH ONCE DAILY FOR BLOOD PRESSURE    Essential hypertension with goal blood pressure less than 140/90

## 2018-11-14 NOTE — PATIENT INSTRUCTIONS
At [facility], we strive to deliver an exceptional experience to you, every time we see you.  If you receive a survey in the mail, please send us back your thoughts. We really do value your feedback.    Based on your medical history, these are the current health maintenance/preventive care services that you are due for (some may have been done at this visit.)  Health Maintenance Due   Topic Date Due     ISRAEL QUESTIONNAIRE 1 YEAR  07/15/2017     PHQ-9 Q1YR  07/15/2017     INFLUENZA VACCINE (1) 09/01/2018         Suggested websites for health information:  Www.[employer] : Up to date and easily searchable information on multiple topics.  Www.medlineplus.gov : medication info, interactive tutorials, watch real surgeries online  Www.familydoctor.org : good info from the Academy of Family Physicians  Www.cdc.gov : public health info, travel advisories, epidemics (H1N1)  Www.aap.org : children's health info, normal development, vaccinations  Www.health.[facility].[place].us : [place] dept of health, public health issues in [place], N1N1    Your care team:                            Family Medicine Internal Medicine   MD Duglas Gresham MD Shantel Branch-Fleming, MD Katya Georgiev PA-C Nam Ho, MD Pediatrics   YASMEEN Rojas, MD Adelaide Coffmna CNP, MD Deborah Mielke, MD Kim Thein, APRN CNP      Clinic hours: Monday - Thursday 7 am-7 pm; Fridays 7 am-5 pm.   Urgent care: Monday - Friday 11 am-9 pm; Saturday and Sunday 9 am-5 pm.  Pharmacy : Monday -Thursday 8 am-8 pm; Friday 8 am-6 pm; Saturday and Sunday 9 am-5 pm.     Clinic: [phone]   Pharmacy: [phone]

## 2018-11-14 NOTE — PROGRESS NOTES
SUBJECTIVE:   Jean Cross is a 33 year old male who presents to clinic today for the following health issues:      Musculoskeletal problem/pain      Duration: 3 months, since started playing [employer]    Description  Location: both knees down    Intensity:  moderate, severe    Accompanying signs and symptoms: knees locked/stiff in the morning.    History  Previous similar problem: no   Previous evaluation:  none    Precipitating or alleviating factors:  Trauma or overuse: no   Aggravating factors include: over use, increase in activity     Therapies tried and outcome: ibuprofen- helps with sleep.      Problem list and histories reviewed & adjusted, as indicated.  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     Essential hypertension with goal blood pressure less than 140/90     Past Surgical History:   Procedure Laterality Date     COLONOSCOPY WITH CO2 INSUFFLATION N/A 8/5/2016    Procedure: COLONOSCOPY WITH CO2 INSUFFLATION;  Surgeon: Duane, William Charles, MD;  Location:  OR     COMBINED ESOPHAGOSCOPY, GASTROSCOPY, DUODENOSCOPY (EGD) WITH CO2 INSUFFLATION N/A 6/29/2016    Procedure: COMBINED ESOPHAGOSCOPY, GASTROSCOPY, DUODENOSCOPY (EGD) WITH CO2 INSUFFLATION;  Surgeon: Duane, William Charles, MD;  Location: [facility] OR     ESOPHAGOSCOPY, GASTROSCOPY, DUODENOSCOPY (EGD), COMBINED N/A 6/29/2016    Procedure: COMBINED ESOPHAGOSCOPY, GASTROSCOPY, DUODENOSCOPY (EGD), BIOPSY SINGLE OR MULTIPLE;  Surgeon: Duane, William Charles, MD;  Location: [facility] OR     HC CAPSULE ENDOSCOPY N/A 3/27/2017    Procedure: CAPSULE/PILL CAM ENDOSCOPY;  Surgeon: Chavez Mirza MD;  Location:  GI       Social History  "  Substance Use Topics     Smoking status: Never Smoker     Smokeless tobacco: Never Used     Alcohol use Yes      Comment: 2-3 drinks every month      Family History   Problem Relation Age of Onset     Hypertension Mother      Hypertension Father      Diabetes Father      Cerebrovascular Disease Maternal Grandmother      Cancer Maternal Grandfather      throat cancer     Arthritis Paternal Grandmother            Reviewed and updated as needed this visit by clinical staff  Tobacco  Allergies  Meds  Med Hx  Surg Hx  Fam Hx  Soc Hx      Reviewed and updated as needed this visit by Provider         ROS:  Constitutional, HEENT, cardiovascular, pulmonary, GI, , musculoskeletal, neuro, skin, endocrine and psych systems are negative, except as otherwise noted.    OBJECTIVE:     /83 (BP Location: Left arm, Patient Position: Chair, Cuff Size: Adult Large)  Pulse 90  Temp 98.8  F (37.1  C) (Oral)  Resp 16  Ht 5' 10\" (1.778 m)  Wt 244 lb (110.7 kg)  SpO2 97%  BMI 35.01 kg/m2  Body mass index is 35.01 kg/(m^2).  GENERAL: healthy, alert and no distress  NECK: no adenopathy, no asymmetry, masses, or scars and thyroid normal to palpation  RESP: lungs clear to auscultation - no rales, rhonchi or wheezes  CV: regular rate and rhythm, normal S1 S2, no S3 or S4, no murmur, click or rub, no peripheral edema and peripheral pulses strong  ABDOMEN: soft, nontender, no hepatosplenomegaly, no masses and bowel sounds normal  MS: mild tenderness along shins bilaterally, mild right heel tenderness  Diagnostic Test Results:  none     ASSESSMENT/PLAN:     1. Plantar fasciitis  Shoes indoors discussed. May take nsaids as needed. Ice and stretching discussed.   - diclofenac (VOLTAREN) 75 MG EC tablet; Take 1 tablet (75 mg) by mouth 2 times daily as needed for moderate pain  Dispense: 60 tablet; Refill: 3  - ORTHO  REFERRAL    2. Pain in both knees, unspecified chronicity  Likely strain, tendonitis.   - diclofenac " (VOLTAREN) 75 MG EC tablet; Take 1 tablet (75 mg) by mouth 2 times daily as needed for moderate pain  Dispense: 60 tablet; Refill: 3  - ORTHO  REFERRAL    3. Pain in shin, unspecified laterality    - diclofenac (VOLTAREN) 75 MG EC tablet; Take 1 tablet (75 mg) by mouth 2 times daily as needed for moderate pain  Dispense: 60 tablet; Refill: 3  - ORTHO  REFERRAL    4. Flu vaccine need    - ADMIN 1st VACCINE  - FLU VAC PRESRV FREE QUAD SPLIT VIR, IM (3+ YRS)    See Patient Instructions    Gadiel Gamez MD, MD  [facility]

## 2019-02-24 DIAGNOSIS — I10 ESSENTIAL HYPERTENSION WITH GOAL BLOOD PRESSURE LESS THAN 140/90: ICD-10-CM

## 2019-02-24 NOTE — TELEPHONE ENCOUNTER
"Requested Prescriptions   Pending Prescriptions Disp Refills     losartan (COZAAR) 25 MG tablet [Pharmacy Med Name: LOSARTAN 25MG   TAB] 30 tablet 6    Last Written Prescription Date:  7/27/18  Last Fill Quantity: 30,  # refills: 6   Last Office Visit with G, P or [facility] prescribing provider:  11/14/18   Future Office Visit:      Sig: TAKE 1 TABLET BY MOUTH ONCE DAILY FOR BLOOD PRESSURE    Angiotensin-II Receptors Passed - 2/24/2019  8:27 AM       Passed - Blood pressure under 140/90 in past 12 months    BP Readings from Last 3 Encounters:   11/14/18 120/83   05/23/18 122/81   06/19/17 126/80                Passed - Recent (12 mo) or future (30 days) visit within the authorizing provider's specialty    Patient had office visit in the last 12 months or has a visit in the next 30 days with authorizing provider or within the authorizing provider's specialty.  See \"Patient Info\" tab in inbasket, or \"Choose Columns\" in Meds & Orders section of the refill encounter.             Passed - Medication is active on med list       Passed - Patient is age 18 or older       Passed - Normal serum creatinine on file in past 12 months    Recent Labs   Lab Test 05/23/18  0741   CR 0.96            Passed - Normal serum potassium on file in past 12 months    Recent Labs   Lab Test 05/23/18  0741   POTASSIUM 4.5                      "

## 2019-02-26 RX ORDER — LOSARTAN POTASSIUM 25 MG/1
TABLET ORAL
Qty: 30 TABLET | Refills: 2 | Status: SHIPPED | OUTPATIENT
Start: 2019-02-26 | End: 2019-04-25

## 2019-02-26 NOTE — TELEPHONE ENCOUNTER
Prescription approved per [facility] Refill Protocol.  Suzanne Segura RN

## 2019-04-25 ENCOUNTER — OFFICE VISIT (OUTPATIENT)
Dept: FAMILY MEDICINE | Facility: CLINIC | Age: 34
End: 2019-04-25
Payer: COMMERCIAL

## 2019-04-25 VITALS
HEART RATE: 79 BPM | DIASTOLIC BLOOD PRESSURE: 78 MMHG | SYSTOLIC BLOOD PRESSURE: 126 MMHG | BODY MASS INDEX: 35.65 KG/M2 | RESPIRATION RATE: 12 BRPM | OXYGEN SATURATION: 98 % | WEIGHT: 249 LBS | HEIGHT: 70 IN | TEMPERATURE: 98.5 F

## 2019-04-25 DIAGNOSIS — M79.669 PAIN IN SHIN, UNSPECIFIED LATERALITY: ICD-10-CM

## 2019-04-25 DIAGNOSIS — M25.562 PAIN IN BOTH KNEES, UNSPECIFIED CHRONICITY: ICD-10-CM

## 2019-04-25 DIAGNOSIS — I10 ESSENTIAL HYPERTENSION WITH GOAL BLOOD PRESSURE LESS THAN 140/90: Primary | ICD-10-CM

## 2019-04-25 DIAGNOSIS — M25.561 PAIN IN BOTH KNEES, UNSPECIFIED CHRONICITY: ICD-10-CM

## 2019-04-25 DIAGNOSIS — L43.8 OTHER LICHEN PLANUS: Primary | ICD-10-CM

## 2019-04-25 DIAGNOSIS — R21 RASH: ICD-10-CM

## 2019-04-25 DIAGNOSIS — M72.2 PLANTAR FASCIITIS: ICD-10-CM

## 2019-04-25 DIAGNOSIS — J34.89 NASAL DISCHARGE: ICD-10-CM

## 2019-04-25 LAB
ALBUMIN SERPL-MCNC: 4 G/DL (ref 3.4–5)
ALP SERPL-CCNC: 122 U/L (ref 40–150)
ALT SERPL W P-5'-P-CCNC: 90 U/L (ref 0–70)
ANION GAP SERPL CALCULATED.3IONS-SCNC: 8 MMOL/L (ref 3–14)
AST SERPL W P-5'-P-CCNC: 46 U/L (ref 0–45)
BILIRUB SERPL-MCNC: 0.5 MG/DL (ref 0.2–1.3)
BUN SERPL-MCNC: 15 MG/DL (ref 7–30)
CALCIUM SERPL-MCNC: 8.7 MG/DL (ref 8.5–10.1)
CHLORIDE SERPL-SCNC: 106 MMOL/L (ref 94–109)
CO2 SERPL-SCNC: 25 MMOL/L (ref 20–32)
CREAT SERPL-MCNC: 1.09 MG/DL (ref 0.66–1.25)
CREAT UR-MCNC: 223 MG/DL
DIFFERENTIAL METHOD BLD: NORMAL
EOSINOPHIL # BLD AUTO: 0.3 10E9/L (ref 0–0.7)
EOSINOPHIL NFR BLD AUTO: 3 %
ERYTHROCYTE [DISTWIDTH] IN BLOOD BY AUTOMATED COUNT: 13 % (ref 10–15)
GFR SERPL CREATININE-BSD FRML MDRD: 87 ML/MIN/{1.73_M2}
GLUCOSE SERPL-MCNC: 83 MG/DL (ref 70–99)
HCT VFR BLD AUTO: 42.6 % (ref 40–53)
HGB BLD-MCNC: 14.5 G/DL (ref 13.3–17.7)
LYMPHOCYTES # BLD AUTO: 4.9 10E9/L (ref 0.8–5.3)
LYMPHOCYTES NFR BLD AUTO: 53 %
MCH RBC QN AUTO: 29.4 PG (ref 26.5–33)
MCHC RBC AUTO-ENTMCNC: 34 G/DL (ref 31.5–36.5)
MCV RBC AUTO: 86 FL (ref 78–100)
MICROALBUMIN UR-MCNC: 10 MG/L
MICROALBUMIN/CREAT UR: 4.53 MG/G CR (ref 0–17)
MONOCYTES # BLD AUTO: 0.7 10E9/L (ref 0–1.3)
MONOCYTES NFR BLD AUTO: 8 %
NEUTROPHILS # BLD AUTO: 3.3 10E9/L (ref 1.6–8.3)
NEUTROPHILS NFR BLD AUTO: 36 %
PLATELET # BLD AUTO: 210 10E9/L (ref 150–450)
PLATELET # BLD EST: NORMAL 10*3/UL
POTASSIUM SERPL-SCNC: 3.7 MMOL/L (ref 3.4–5.3)
PROT SERPL-MCNC: 7.6 G/DL (ref 6.8–8.8)
RBC # BLD AUTO: 4.93 10E12/L (ref 4.4–5.9)
RBC MORPH BLD: NORMAL
SODIUM SERPL-SCNC: 139 MMOL/L (ref 133–144)
VARIANT LYMPHS BLD QL SMEAR: PRESENT
WBC # BLD AUTO: 9.2 10E9/L (ref 4–11)

## 2019-04-25 PROCEDURE — 36415 COLL VENOUS BLD VENIPUNCTURE: CPT | Performed by: DERMATOLOGY

## 2019-04-25 PROCEDURE — 86039 ANTINUCLEAR ANTIBODIES (ANA): CPT | Performed by: DERMATOLOGY

## 2019-04-25 PROCEDURE — 85025 COMPLETE CBC W/AUTO DIFF WBC: CPT | Performed by: DERMATOLOGY

## 2019-04-25 PROCEDURE — 80053 COMPREHEN METABOLIC PANEL: CPT | Performed by: DERMATOLOGY

## 2019-04-25 PROCEDURE — 87340 HEPATITIS B SURFACE AG IA: CPT | Performed by: DERMATOLOGY

## 2019-04-25 PROCEDURE — 86038 ANTINUCLEAR ANTIBODIES: CPT | Performed by: DERMATOLOGY

## 2019-04-25 PROCEDURE — 82043 UR ALBUMIN QUANTITATIVE: CPT | Performed by: FAMILY MEDICINE

## 2019-04-25 PROCEDURE — 99213 OFFICE O/P EST LOW 20 MIN: CPT | Performed by: FAMILY MEDICINE

## 2019-04-25 RX ORDER — TRIAMCINOLONE ACETONIDE 1 MG/G
CREAM TOPICAL
Refills: 1 | COMMUNITY
Start: 2019-04-08 | End: 2020-10-22

## 2019-04-25 RX ORDER — LOSARTAN POTASSIUM 25 MG/1
TABLET ORAL
Qty: 90 TABLET | Refills: 3 | Status: SHIPPED | OUTPATIENT
Start: 2019-04-25 | End: 2020-05-12

## 2019-04-25 RX ORDER — DICLOFENAC SODIUM 75 MG/1
75 TABLET, DELAYED RELEASE ORAL 2 TIMES DAILY PRN
Qty: 60 TABLET | Refills: 3 | Status: SHIPPED | OUTPATIENT
Start: 2019-04-25 | End: 2020-05-12

## 2019-04-25 ASSESSMENT — PAIN SCALES - GENERAL: PAINLEVEL: NO PAIN (0)

## 2019-04-25 ASSESSMENT — MIFFLIN-ST. JEOR: SCORE: 2065.71

## 2019-04-25 NOTE — PATIENT INSTRUCTIONS
At [facility], we strive to deliver an exceptional experience to you, every time we see you.  If you receive a survey in the mail, please send us back your thoughts. We really do value your feedback.    Based on your medical history, these are the current health maintenance/preventive care services that you are due for (some may have been done at this visit.)  Health Maintenance Due   Topic Date Due     ISRAEL QUESTIONNAIRE 1 YEAR  07/15/2017     DTAP/TDAP/TD IMMUNIZATION (2 - Td) 03/06/2019     BMP Q1 YR  05/23/2019     PREVENTIVE CARE VISIT  05/23/2019     LIPID MONITORING Q1 YEAR  05/23/2019         Suggested websites for health information:  Www.[employer].org : Up to date and easily searchable information on multiple topics.  Www.medlineplus.gov : medication info, interactive tutorials, watch real surgeries online  Www.familydoctor.org : good info from the Academy of Family Physicians  Www.cdc.gov : public health info, travel advisories, epidemics (H1N1)  Www.aap.org : children's health info, normal development, vaccinations  Www.health.[facility].[place].us : [place] dept of health, public health issues in [place], N1N1    Your care team:                            Family Medicine Internal Medicine   MD Duglas Gresham MD Shantel Branch-Fleming, MD Katya Georgiev PA-C Nam Ho, MD Pediatrics   YASMEEN Rojas, MD Adelaide Coffman CNP, MD Deborah Mielke, MD Kim Thein, APRN CNP      Clinic hours: Monday - Thursday 7 am-7 pm; Fridays 7 am-5 pm.   Urgent care: Monday - Friday 11 am-9 pm; Saturday and Sunday 9 am-5 pm.  Pharmacy : Monday -Thursday 8 am-8 pm; Friday 8 am-6 pm; Saturday and Sunday 9 am-5 pm.     Clinic: [phone]   Pharmacy: [phone]

## 2019-04-25 NOTE — PROGRESS NOTES
SUBJECTIVE:   Jean Cross is a 34 year old male who presents to clinic today for the following   health issues:      Hypertension Follow-up      Outpatient blood pressures are not being checked.    Low Salt Diet: not monitoring salt      Amount of exercise or physical activity: 2-3 days/week for an average of greater than 60 minutes    Problems taking medications regularly: No    Medication side effects: none    Diet: regular (no restrictions)      RESPIRATORY SYMPTOMS      Duration: 2 weeks    Description  cough, ear pain right and stomach ache lower abdominal area    Severity: mild    Accompanying signs and symptoms:  Blowing out mucus with bright red blood    History (predisposing factors):  none    Precipitating or alleviating factors: None    Therapies tried and outcome:  Nyquil, dayquil     Rash      Duration: 3 months    Description  Location: hands, arms and legs  Itching: mild    Intensity:  moderate    Accompanying signs and symptoms: red, dotty and raised    History (similar episodes/previous evaluation): None    Precipitating or alleviating factors:  New exposures:  None  Recent travel: no      Therapies tried and outcome: triamcinolone prescribed by dermatologist      Additional history: as documented    Reviewed  and updated as needed this visit by clinical staff  Tobacco  Allergies  Meds  Med Hx  Surg Hx  Fam Hx  Soc Hx        Reviewed and updated as needed this visit by Provider         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     Essential hypertension with goal blood pressure less than 140/90     Past Surgical History:   Procedure Laterality Date     COLONOSCOPY WITH CO2 INSUFFLATION N/A 8/5/2016    Procedure:  "COLONOSCOPY WITH CO2 INSUFFLATION;  Surgeon: Duane, William Charles, MD;  Location: [facility]     COMBINED ESOPHAGOSCOPY, GASTROSCOPY, DUODENOSCOPY (EGD) WITH CO2 INSUFFLATION N/A 6/29/2016    Procedure: COMBINED ESOPHAGOSCOPY, GASTROSCOPY, DUODENOSCOPY (EGD) WITH CO2 INSUFFLATION;  Surgeon: Duane, William Charles, MD;  [facility]: [facility]     ESOPHAGOSCOPY, GASTROSCOPY, DUODENOSCOPY (EGD), COMBINED N/A 6/29/2016    Procedure: COMBINED ESOPHAGOSCOPY, GASTROSCOPY, DUODENOSCOPY (EGD), BIOPSY SINGLE OR MULTIPLE;  Surgeon: Duane, William Charles, MD;  Location: [facility]     HC CAPSULE ENDOSCOPY N/A 3/27/2017    Procedure: CAPSULE/PILL CAM ENDOSCOPY;  Surgeon: Chavez Mirza MD;  Location: [facility]       Social History     Tobacco Use     Smoking status: Never Smoker     Smokeless tobacco: Never Used   Substance Use Topics     Alcohol use: Yes     Comment: 2-3 drinks every month      Family History   Problem Relation Age of Onset     Hypertension Mother      Hypertension Father      Diabetes Father      Cerebrovascular Disease Maternal Grandmother      Cancer Maternal Grandfather         throat cancer     Arthritis Paternal Grandmother            ROS:  Constitutional, HEENT, cardiovascular, pulmonary, GI, , musculoskeletal, neuro, skin, endocrine and psych systems are negative, except as otherwise noted.    OBJECTIVE:     /78 (BP Location: Left arm, Patient Position: Chair, Cuff Size: Adult Large)   Pulse 79   Temp 98.5  F (36.9  C) (Oral)   Resp 12   Ht 1.778 m (5' 10\")   Wt 112.9 kg (249 lb)   SpO2 98%   BMI 35.73 kg/m    Body mass index is 35.73 kg/m .  GENERAL: healthy, alert and no distress  NECK: no adenopathy, no asymmetry, masses, or scars and thyroid normal to palpation  RESP: lungs clear to auscultation - no rales, rhonchi or wheezes  CV: regular rate and rhythm, normal S1 S2, no S3 or S4, no murmur, click or rub, no peripheral edema and peripheral pulses strong  ABDOMEN: soft, nontender, no " hepatosplenomegaly, no masses and bowel sounds normal  MS: no gross musculoskeletal defects noted, no edema    Diagnostic Test Results:  none     ASSESSMENT/PLAN:     1. Essential hypertension with goal blood pressure less than 140/90  Controlled. RTC in 6 months for monitoring.  - losartan (COZAAR) 25 MG tablet; TAKE 1 TABLET BY MOUTH ONCE DAILY FOR BLOOD PRESSURE  Dispense: 90 tablet; Refill: 3  - Albumin Random Urine Quantitative with Creat Ratio    2. Plantar fasciitis  Needed refills  - diclofenac (VOLTAREN) 75 MG EC tablet; Take 1 tablet (75 mg) by mouth 2 times daily as needed for moderate pain  Dispense: 60 tablet; Refill: 3    3. Pain in both knees, unspecified chronicity  Needed refills.  - diclofenac (VOLTAREN) 75 MG EC tablet; Take 1 tablet (75 mg) by mouth 2 times daily as needed for moderate pain  Dispense: 60 tablet; Refill: 3    4. Pain in shin, unspecified laterality  Needed refills.  - diclofenac (VOLTAREN) 75 MG EC tablet; Take 1 tablet (75 mg) by mouth 2 times daily as needed for moderate pain  Dispense: 60 tablet; Refill: 3    5. Rash  Following Dermatology.    6. Nasal discharge  Blood discharge mucous for 2 months? Referred to ENT for evaluation and recommendations.  - OTOLARYNGOLOGY REFERRAL    See Patient Instructions    Gadiel Gamez MD, MD  [facility]

## 2019-04-26 LAB
ANA PAT SER IF-IMP: ABNORMAL
ANA SER QL IF: POSITIVE
ANA TITR SER IF: ABNORMAL {TITER}
HBV SURFACE AG SERPL QL IA: NONREACTIVE

## 2019-04-29 ENCOUNTER — MYC MEDICAL ADVICE (OUTPATIENT)
Dept: FAMILY MEDICINE | Facility: CLINIC | Age: 34
End: 2019-04-29

## 2019-04-30 ENCOUNTER — MYC MEDICAL ADVICE (OUTPATIENT)
Dept: FAMILY MEDICINE | Facility: CLINIC | Age: 34
End: 2019-04-30

## 2019-04-30 NOTE — TELEPHONE ENCOUNTER
Routing to provider to review and advise on MyC message below. Also see MyC message from yesterday in Chart Review. Patient was asking for lab results on tests ordered by Dr. Mckinney, his dermatologist. Patient was referred to speak with dermatology office as they were ordering provider. Patient sent message below back in return.    Radha Thomson RN

## 2019-05-02 ENCOUNTER — MYC MEDICAL ADVICE (OUTPATIENT)
Dept: FAMILY MEDICINE | Facility: CLINIC | Age: 34
End: 2019-05-02

## 2019-05-02 DIAGNOSIS — R76.8 ANA POSITIVE: Primary | ICD-10-CM

## 2019-05-17 ENCOUNTER — OFFICE VISIT (OUTPATIENT)
Dept: FAMILY MEDICINE | Facility: CLINIC | Age: 34
End: 2019-05-17
Payer: COMMERCIAL

## 2019-05-17 VITALS
RESPIRATION RATE: 18 BRPM | OXYGEN SATURATION: 96 % | WEIGHT: 249 LBS | SYSTOLIC BLOOD PRESSURE: 128 MMHG | BODY MASS INDEX: 35.65 KG/M2 | TEMPERATURE: 98.5 F | HEART RATE: 91 BPM | HEIGHT: 70 IN | DIASTOLIC BLOOD PRESSURE: 79 MMHG

## 2019-05-17 DIAGNOSIS — R79.89 ELEVATED LFTS: ICD-10-CM

## 2019-05-17 DIAGNOSIS — K59.00 CONSTIPATION, UNSPECIFIED CONSTIPATION TYPE: Primary | ICD-10-CM

## 2019-05-17 LAB
ALBUMIN SERPL-MCNC: 4 G/DL (ref 3.4–5)
ALP SERPL-CCNC: 152 U/L (ref 40–150)
ALT SERPL W P-5'-P-CCNC: 71 U/L (ref 0–70)
AST SERPL W P-5'-P-CCNC: 29 U/L (ref 0–45)
BILIRUB DIRECT SERPL-MCNC: 0.1 MG/DL (ref 0–0.2)
BILIRUB SERPL-MCNC: 0.5 MG/DL (ref 0.2–1.3)
PROT SERPL-MCNC: 7.7 G/DL (ref 6.8–8.8)

## 2019-05-17 PROCEDURE — 99213 OFFICE O/P EST LOW 20 MIN: CPT | Performed by: FAMILY MEDICINE

## 2019-05-17 PROCEDURE — 36415 COLL VENOUS BLD VENIPUNCTURE: CPT | Performed by: FAMILY MEDICINE

## 2019-05-17 PROCEDURE — 80076 HEPATIC FUNCTION PANEL: CPT | Performed by: FAMILY MEDICINE

## 2019-05-17 ASSESSMENT — PAIN SCALES - GENERAL: PAINLEVEL: MODERATE PAIN (4)

## 2019-05-17 ASSESSMENT — MIFFLIN-ST. JEOR: SCORE: 2065.71

## 2019-05-17 NOTE — PROGRESS NOTES
SUBJECTIVE:   Jean Cross is a 34 year old male who presents to clinic today for the following   health issues:      Abdominal Pain      Duration: going on for a while    Description (location/character/radiation):  Ache, lower abdominal        Associated flank pain: None    Intensity:  mild    Accompanying signs and symptoms:        Fever/Chills: no        Gas/Bloating: no        Nausea/vomitting: no        Diarrhea: no       Constipation: YES        Dysuria or Hematuria: no     History (previous similar pain/trauma/previous testing): none    Precipitating or alleviating factors:       Pain worse with eating/BM/urination: No       Pain relieved by BM: no     Therapies tried and outcome: magnesium citrate, metamacil    LMP:  not applicable      Additional history: as documented    Reviewed  and updated as needed this visit by clinical staff  Tobacco  Allergies  Meds  Med Hx  Surg Hx  Fam Hx  Soc Hx        Reviewed and updated as needed this visit by Provider         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     Essential hypertension with goal blood pressure less than 140/90     Past Surgical History:   Procedure Laterality Date     COLONOSCOPY WITH CO2 INSUFFLATION N/A 8/5/2016    Procedure: COLONOSCOPY WITH CO2 INSUFFLATION;  Surgeon: Duane, William Charles, MD;  Location: [facility]     COMBINED ESOPHAGOSCOPY, GASTROSCOPY, DUODENOSCOPY (EGD) WITH CO2 INSUFFLATION N/A 6/29/2016    Procedure: COMBINED ESOPHAGOSCOPY, GASTROSCOPY, DUODENOSCOPY (EGD) WITH CO2 INSUFFLATION;  Surgeon: Duane, William Charles, MD;  [facility]: [facility]     ESOPHAGOSCOPY, GASTROSCOPY, DUODENOSCOPY (EGD), COMBINED N/A 6/29/2016    Procedure: COMBINED ESOPHAGOSCOPY,  "GASTROSCOPY, DUODENOSCOPY (EGD), BIOPSY SINGLE OR MULTIPLE;  Surgeon: Duane, William Charles, MD;  Location: [facility]     HC CAPSULE ENDOSCOPY N/A 3/27/2017    Procedure: CAPSULE/PILL CAM ENDOSCOPY;  Surgeon: Chavez Mirza MD;  Location: [facility]       Social History     Tobacco Use     Smoking status: Never Smoker     Smokeless tobacco: Never Used   Substance Use Topics     Alcohol use: Yes     Comment: 2-3 drinks every month      Family History   Problem Relation Age of Onset     Hypertension Mother      Hypertension Father      Diabetes Father      Cerebrovascular Disease Maternal Grandmother      Cancer Maternal Grandfather         throat cancer     Arthritis Paternal Grandmother            ROS:  Constitutional, HEENT, cardiovascular, pulmonary, GI, , musculoskeletal, neuro, skin, endocrine and psych systems are negative, except as otherwise noted.    OBJECTIVE:     /79 (BP Location: Left arm, Patient Position: Chair, Cuff Size: Adult Large)   Pulse 91   Temp 98.5  F (36.9  C) (Oral)   Resp 18   Ht 1.778 m (5' 10\")   Wt 112.9 kg (249 lb)   SpO2 96%   BMI 35.73 kg/m    Body mass index is 35.73 kg/m .  GENERAL: healthy, alert and no distress  NECK: no adenopathy, no asymmetry, masses, or scars and thyroid normal to palpation  RESP: lungs clear to auscultation - no rales, rhonchi or wheezes  CV: regular rate and rhythm, normal S1 S2, no S3 or S4, no murmur, click or rub, no peripheral edema and peripheral pulses strong  ABDOMEN: soft, nontender, no hepatosplenomegaly, no masses and bowel sounds normal  MS: no gross musculoskeletal defects noted, no edema    Diagnostic Test Results:  none     ASSESSMENT/PLAN:     1. Constipation, unspecified constipation type  Continue with fiber supplement. Patient was worried that his body \"will get use to.\" Reassured that fiber does not do this and continue to be helpful. Patient will continue with fiber supplement and keeping hydrated. This helps.    2. Elevated " LFTs  Recheck.  - Hepatic panel (Albumin, ALT, AST, Bili, Alk Phos, TP)    Work on weight loss  Regular exercise  See Patient Instructions    Gadiel Gamez MD, MD  [facility]

## 2019-05-17 NOTE — PATIENT INSTRUCTIONS
At [facility], we strive to deliver an exceptional experience to you, every time we see you.  If you receive a survey in the mail, please send us back your thoughts. We really do value your feedback.    Based on your medical history, these are the current health maintenance/preventive care services that you are due for (some may have been done at this visit.)  Health Maintenance Due   Topic Date Due     ISRAEL QUESTIONNAIRE 1 YEAR  07/15/2017     DTAP/TDAP/TD IMMUNIZATION (2 - Td) 03/06/2019     PREVENTIVE CARE VISIT  05/23/2019     LIPID MONITORING Q1 YEAR  05/23/2019         Suggested websites for health information:  Www.[facility].org : Up to date and easily searchable information on multiple topics.  Www.medlineplus.gov : medication info, interactive tutorials, watch real surgeries online  Www.familydoctor.org : good info from the Academy of Family Physicians  Www.cdc.gov : public health info, travel advisories, epidemics (H1N1)  Www.aap.org : children's health info, normal development, vaccinations  Www.health.[facility].[place].us : [place] dept of health, public health issues in [place], N1N1    Your care team:                            Family Medicine Internal Medicine   MD Duglas Gresham MD Shantel Branch-Fleming, MD Katya Georgiev PA-C Nam Ho, MD Pediatrics   YASMEEN Rojas, MD Adelaide Coffman CNP, MD Deborah Mielke, MD Kim Thein, APRN [facility]      Clinic hours: Monday - Thursday 7 am-7 pm; Fridays 7 am-5 pm.   Urgent care: Monday - Friday 11 am-9 pm; Saturday and Sunday 9 am-5 pm.  Pharmacy : Monday -Thursday 8 am-8 pm; Friday 8 am-6 pm; Saturday and Sunday 9 am-5 pm.     Clinic: [phone]   Pharmacy: [phone]

## 2019-08-22 ENCOUNTER — OFFICE VISIT (OUTPATIENT)
Dept: FAMILY MEDICINE | Facility: CLINIC | Age: 34
End: 2019-08-22
Payer: COMMERCIAL

## 2019-08-22 VITALS
SYSTOLIC BLOOD PRESSURE: 116 MMHG | BODY MASS INDEX: 36.08 KG/M2 | WEIGHT: 252 LBS | RESPIRATION RATE: 12 BRPM | TEMPERATURE: 98.6 F | OXYGEN SATURATION: 98 % | DIASTOLIC BLOOD PRESSURE: 80 MMHG | HEIGHT: 70 IN | HEART RATE: 83 BPM

## 2019-08-22 DIAGNOSIS — R76.8 ANTI-TPO ANTIBODIES PRESENT: Primary | ICD-10-CM

## 2019-08-22 PROCEDURE — 36415 COLL VENOUS BLD VENIPUNCTURE: CPT | Performed by: FAMILY MEDICINE

## 2019-08-22 PROCEDURE — 84481 FREE ASSAY (FT-3): CPT | Performed by: FAMILY MEDICINE

## 2019-08-22 PROCEDURE — 84443 ASSAY THYROID STIM HORMONE: CPT | Performed by: FAMILY MEDICINE

## 2019-08-22 PROCEDURE — 86800 THYROGLOBULIN ANTIBODY: CPT | Performed by: FAMILY MEDICINE

## 2019-08-22 PROCEDURE — 99213 OFFICE O/P EST LOW 20 MIN: CPT | Performed by: FAMILY MEDICINE

## 2019-08-22 PROCEDURE — 86376 MICROSOMAL ANTIBODY EACH: CPT | Performed by: FAMILY MEDICINE

## 2019-08-22 PROCEDURE — 84439 ASSAY OF FREE THYROXINE: CPT | Performed by: FAMILY MEDICINE

## 2019-08-22 ASSESSMENT — MIFFLIN-ST. JEOR: SCORE: 2079.31

## 2019-08-22 ASSESSMENT — PAIN SCALES - GENERAL: PAINLEVEL: NO PAIN (0)

## 2019-08-22 NOTE — PROGRESS NOTES
Sona Cross is a 34 year old male who presents to clinic today for the following health issues:    HPI   Follow up to discuss Thyroid test.    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 Denies feeling hot/cold. Denies recent weight loss. Patient has problems with weight gain, unable to lose weight. No palpitations.    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     Essential hypertension with goal blood pressure less than 140/90     Past Surgical History:   Procedure Laterality Date     COLONOSCOPY WITH CO2 INSUFFLATION N/A 8/5/2016    Procedure: COLONOSCOPY WITH CO2 INSUFFLATION;  Surgeon: Duane, William Charles, MD;  Location:  OR     COMBINED ESOPHAGOSCOPY, GASTROSCOPY, DUODENOSCOPY (EGD) WITH CO2 INSUFFLATION N/A 6/29/2016    Procedure: COMBINED ESOPHAGOSCOPY, GASTROSCOPY, DUODENOSCOPY (EGD) WITH CO2 INSUFFLATION;  Surgeon: Duane, William Charles, MD;  Location: [facility]     ESOPHAGOSCOPY, GASTROSCOPY, DUODENOSCOPY (EGD), COMBINED N/A 6/29/2016    Procedure: COMBINED ESOPHAGOSCOPY, GASTROSCOPY, DUODENOSCOPY (EGD), BIOPSY SINGLE OR MULTIPLE;  Surgeon: Duane, William Charles, MD;  Location:  OR     HC CAPSULE ENDOSCOPY N/A 3/27/2017    Procedure: CAPSULE/PILL CAM ENDOSCOPY;  Surgeon: Chavez Mirza MD;  Location:  GI       Social History     Tobacco Use     Smoking status: Never Smoker     Smokeless tobacco: Never Used   Substance Use Topics     Alcohol use: Yes     Comment: 2-3 drinks every month      Family History   Problem Relation Age of Onset     Hypertension Mother      Hypertension Father      Diabetes Father      Cerebrovascular Disease Maternal Grandmother      Cancer  "Maternal Grandfather         throat cancer     Arthritis Paternal Grandmother            Reviewed and updated as needed this visit by Provider         Review of Systems   ROS COMP: Constitutional, HEENT, cardiovascular, pulmonary, GI, , musculoskeletal, neuro, skin, endocrine and psych systems are negative, except as otherwise noted.      Objective    /80 (BP Location: Left arm, Patient Position: Sitting, Cuff Size: Adult Large)   Pulse 83   Temp 98.6  F (37  C) (Oral)   Resp 12   Ht 1.778 m (5' 10\")   Wt 114.3 kg (252 lb)   SpO2 98%   BMI 36.16 kg/m    Body mass index is 36.16 kg/m .  Physical Exam   GENERAL: healthy, alert and no distress  NECK: no adenopathy, no asymmetry, masses, or scars and thyroid normal to palpation  RESP: lungs clear to auscultation - no rales, rhonchi or wheezes  CV: regular rate and rhythm, normal S1 S2, no S3 or S4, no murmur, click or rub, no peripheral edema and peripheral pulses strong  ABDOMEN: soft, nontender, no hepatosplenomegaly, no masses and bowel sounds normal  MS: no gross musculoskeletal defects noted, no edema    Diagnostic Test Results:  Labs reviewed in Epic        Assessment & Plan     1. Anti-TPO antibodies present  Recheck labs. Referred to Endocrinology.  - TSH  - T4, free  - T3, Free  - Thyroid peroxidase antibody  - Anti thyroglobulin antibody  - ENDOCRINOLOGY ADULT REFERRAL     BMI:   Estimated body mass index is 36.16 kg/m  as calculated from the following:    Height as of this encounter: 1.778 m (5' 10\").    Weight as of this encounter: 114.3 kg (252 lb).           See Patient Instructions    Return in about 6 months (around 2/22/2020) for Physical Exam.    Gadiel Gamez MD, MD  [facility]      "

## 2019-08-22 NOTE — PATIENT INSTRUCTIONS
At [facility], we strive to deliver an exceptional experience to you, every time we see you.  If you receive a survey in the mail, please send us back your thoughts. We really do value your feedback.    Based on your medical history, these are the current health maintenance/preventive care services that you are due for (some may have been done at this visit.)  Health Maintenance Due   Topic Date Due     ISRAEL ASSESSMENT  07/15/2017     DTAP/TDAP/TD IMMUNIZATION (2 - Td) 03/06/2019     PREVENTIVE CARE VISIT  05/23/2019     LIPID  05/23/2019         Suggested websites for health information:  Www.[facility].org : Up to date and easily searchable information on multiple topics.  Www.medlineplus.gov : medication info, interactive tutorials, watch real surgeries online  Www.familydoctor.org : good info from the Academy of Family Physicians  Www.cdc.gov : public health info, travel advisories, epidemics (H1N1)  Www.aap.org : children's health info, normal development, vaccinations  Www.health.[facility].[place].us : [place] dept of health, public health issues in [place], N1N1    Your care team:                            Family Medicine Internal Medicine   MD Duglas Gresham MD Shantel Branch-Fleming, MD Katya Georgiev PA-C Nam Ho, MD Pediatrics   YASMEEN Rojas, KATTY Alex APRN CNP   MD Adelaide Paredes MD Deborah Mielke, MD Kim Thein, APRN [facility]      Clinic hours: Monday - Thursday 7 am-7 pm; Fridays 7 am-5 pm.   Urgent care: Monday - Friday 11 am-9 pm; Saturday and Sunday 9 am-5 pm.  Pharmacy : Monday -Thursday 8 am-8 pm; Friday 8 am-6 pm; Saturday and Sunday 9 am-5 pm.     Clinic: [phone]   Pharmacy: [phone]

## 2019-08-23 LAB
T3FREE SERPL-MCNC: 2.9 PG/ML (ref 2.3–4.2)
T4 FREE SERPL-MCNC: 1.07 NG/DL (ref 0.76–1.46)
TSH SERPL DL<=0.005 MIU/L-ACNC: 1.75 MU/L (ref 0.4–4)

## 2019-08-26 LAB
THYROGLOB AB SERPL IA-ACNC: 34 IU/ML (ref 0–40)
THYROPEROXIDASE AB SERPL-ACNC: 18 IU/ML

## 2019-11-09 ENCOUNTER — HEALTH MAINTENANCE LETTER (OUTPATIENT)
Age: 34
End: 2019-11-09

## 2019-12-26 ENCOUNTER — TELEPHONE (OUTPATIENT)
Dept: FAMILY MEDICINE | Facility: CLINIC | Age: 34
End: 2019-12-26

## 2019-12-26 NOTE — TELEPHONE ENCOUNTER
This writer attempted to contact Jean on 12/26/19      Reason for call triage and left message.      If patient calls back:   Registered Nurse called. Follow Triage Call workflow        May Lindsay RN

## 2019-12-26 NOTE — TELEPHONE ENCOUNTER
Reason for Call:  Other prescription    Detailed comments: Patient's son was diagnosed with INFLUENZA A he is wondering if he the dad can be prescribed any medication without him having to come in . Please advise patient.    Phone Number Patient can be reached at: Cell number on file:    Telephone Information:   Mobile [phone]       Best Time: Any    Can we leave a detailed message on this number? YES    Call taken on 12/26/2019 at 11:12 AM by Ruth Carlos

## 2019-12-27 NOTE — TELEPHONE ENCOUNTER
Called and spoke with patient. He states that Dr. Sumner has prescribed him tamaflu in the past as a preventive. Patient states that his wife and son have the flu now and would like.    Patient is aware that he needs to set up a e-visit, office visit or telephone visit so he can be assessed by Dr. SUMNER even if he is not having sx. Patient has agreed to do this.     No further questions at this time      May Lindsay RN

## 2019-12-31 ENCOUNTER — TELEPHONE (OUTPATIENT)
Dept: FAMILY MEDICINE | Facility: CLINIC | Age: 34
End: 2019-12-31

## 2019-12-31 NOTE — TELEPHONE ENCOUNTER
Reason for call:  Symptom   Symptom or request: Patient has the flu with fever at 100.5 is taking ibuprofen and fever will not go away.     Duration (how long have symptoms been present): 5 day   Have you been treated for this before? Yes    Additional comments: Please call and advise    Phone number to reach patient:  Cell number on file:    Telephone Information:   Mobile [phone]       Best Time:  any    Can we leave a detailed message on this number?  YES

## 2019-12-31 NOTE — TELEPHONE ENCOUNTER
Persisting fever and symptoms for 5 days requires an office visit. Left detailed message on patient's voice mail. Reviewed open hours of urgent care at [facility] Advised to be seen today (only open until 5 pm) or tomorrow. May call back and speak to triage nurse for urgent questions/concerns. [facility] phone number and [facility] phone number provided.     Alice Ritchie RN  [facility]

## 2020-05-07 DIAGNOSIS — I10 ESSENTIAL HYPERTENSION WITH GOAL BLOOD PRESSURE LESS THAN 140/90: ICD-10-CM

## 2020-05-11 NOTE — TELEPHONE ENCOUNTER
Due for virtual visit-either hypertension visit or annual visit. Please schedule. If unable to get appointment before medication runs out, route back to RN team to give yunier refill.     Alice Ritchie RN  [facility]

## 2020-05-12 ENCOUNTER — VIRTUAL VISIT (OUTPATIENT)
Dept: FAMILY MEDICINE | Facility: CLINIC | Age: 35
End: 2020-05-12
Payer: COMMERCIAL

## 2020-05-12 DIAGNOSIS — I10 ESSENTIAL HYPERTENSION WITH GOAL BLOOD PRESSURE LESS THAN 140/90: ICD-10-CM

## 2020-05-12 DIAGNOSIS — M54.2 NECK PAIN: Primary | ICD-10-CM

## 2020-05-12 PROCEDURE — 99214 OFFICE O/P EST MOD 30 MIN: CPT | Mod: 95 | Performed by: FAMILY MEDICINE

## 2020-05-12 RX ORDER — LOSARTAN POTASSIUM 25 MG/1
TABLET ORAL
Qty: 90 TABLET | Refills: 0 | Status: SHIPPED | OUTPATIENT
Start: 2020-05-12 | End: 2020-05-12

## 2020-05-12 RX ORDER — LOSARTAN POTASSIUM 25 MG/1
TABLET ORAL
Qty: 90 TABLET | Refills: 1 | Status: SHIPPED | OUTPATIENT
Start: 2020-05-12 | End: 2020-10-22

## 2020-05-12 RX ORDER — CYCLOBENZAPRINE HCL 10 MG
10 TABLET ORAL 3 TIMES DAILY PRN
Qty: 30 TABLET | Refills: 3 | Status: SHIPPED | OUTPATIENT
Start: 2020-05-12 | End: 2020-10-22

## 2020-05-12 RX ORDER — DICLOFENAC SODIUM 75 MG/1
75 TABLET, DELAYED RELEASE ORAL 2 TIMES DAILY PRN
Qty: 60 TABLET | Refills: 3 | Status: SHIPPED | OUTPATIENT
Start: 2020-05-12 | End: 2020-10-22

## 2020-05-12 NOTE — PROGRESS NOTES
"Jean Cross is a 35 year old male who is being evaluated via a billable telephone visit.      The patient has been notified of following:     \"This telephone visit will be conducted via a call between you and your physician/provider. We have found that certain health care needs can be provided without the need for a physical exam.  This service lets us provide the care you need with a short phone conversation.  If a prescription is necessary we can send it directly to your pharmacy.  If lab work is needed we can place an order for that and you can then stop by our lab to have the test done at a later time.    Telephone visits are billed at different rates depending on your insurance coverage. During this emergency period, for some insurers they may be billed the same as an in-person visit.  Please reach out to your insurance provider with any questions.    If during the course of the call the physician/provider feels a telephone visit is not appropriate, you will not be charged for this service.\"    Patient has given verbal consent for Telephone visit?  Yes    What phone number would you like to be contacted at? [phone]    How would you like to obtain your AVS? Joey Magallanes     Jean Cross is a 35 year old male who presents to clinic today for the following health issues:    HPI  Hypertension Follow-up    Patient c/o neck pain for a few days. Has difficulty turning head. No numbness down arms but does have intermittent hand numbness.    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     Essential hypertension with goal blood pressure less than 140/90     Past Surgical History:   Procedure Laterality Date     COLONOSCOPY WITH CO2 " INSUFFLATION N/A 8/5/2016    Procedure: COLONOSCOPY WITH CO2 INSUFFLATION;  Surgeon: Duane, William Charles, MD;  Location: [facility]     COMBINED ESOPHAGOSCOPY, GASTROSCOPY, DUODENOSCOPY (EGD) WITH CO2 INSUFFLATION N/A 6/29/2016    Procedure: COMBINED ESOPHAGOSCOPY, GASTROSCOPY, DUODENOSCOPY (EGD) WITH CO2 INSUFFLATION;  Surgeon: Duane, William Charles, MD;  Location: [facility]     ESOPHAGOSCOPY, GASTROSCOPY, DUODENOSCOPY (EGD), COMBINED N/A 6/29/2016    Procedure: COMBINED ESOPHAGOSCOPY, GASTROSCOPY, DUODENOSCOPY (EGD), BIOPSY SINGLE OR MULTIPLE;  Surgeon: Duane, William Charles, MD;  Location: [facility]     HC CAPSULE ENDOSCOPY N/A 3/27/2017    Procedure: CAPSULE/PILL CAM ENDOSCOPY;  Surgeon: Chavez Mirza MD;  Location: [facility]       Social History     Tobacco Use     Smoking status: Never Smoker     Smokeless tobacco: Never Used   Substance Use Topics     Alcohol use: Yes     Comment: 2-3 drinks every month      Family History   Problem Relation Age of Onset     Hypertension Mother      Hypertension Father      Diabetes Father      Cerebrovascular Disease Maternal Grandmother      Cancer Maternal Grandfather         throat cancer     Arthritis Paternal Grandmother          Current Outpatient Medications   Medication Sig Dispense Refill     cyclobenzaprine (FLEXERIL) 10 MG tablet Take 1 tablet (10 mg) by mouth 3 times daily as needed for muscle spasms 30 tablet 3     diclofenac (VOLTAREN) 75 MG EC tablet Take 1 tablet (75 mg) by mouth 2 times daily as needed for moderate pain 60 tablet 3     losartan (COZAAR) 25 MG tablet Take 1 tablet by mouth once daily for blood pressure 90 tablet 1     triamcinolone (KENALOG) 0.1 % external cream   1     No Known Allergies  BP Readings from Last 3 Encounters:   08/22/19 116/80   05/17/19 128/79   04/25/19 126/78    Wt Readings from Last 3 Encounters:   08/22/19 114.3 kg (252 lb)   05/17/19 112.9 kg (249 lb)   04/25/19 112.9 kg (249 lb)                    Reviewed and updated as  needed this visit by Provider         Review of Systems   Constitutional, HEENT, cardiovascular, pulmonary, GI, , musculoskeletal, neuro, skin, endocrine and psych systems are negative, except as otherwise noted.       Objective   Reported vitals:  There were no vitals taken for this visit.   healthy, alert and no distress  PSYCH: Alert and oriented times 3; coherent speech, normal   rate and volume, able to articulate logical thoughts, able   to abstract reason, no tangential thoughts, no hallucinations   or delusions  His affect is normal  RESP: No cough, no audible wheezing, able to talk in full sentences  Remainder of exam unable to be completed due to telephone visits    Diagnostic Test Results:  Labs reviewed in Epic        Assessment/Plan:    1. Essential hypertension with goal blood pressure less than 140/90  Controlled. RTC in 6 months for recheck. Reviewed low salt diet.  - losartan (COZAAR) 25 MG tablet; Take 1 tablet by mouth once daily for blood pressure  Dispense: 90 tablet; Refill: 1    2. Neck pain  Likely muscular pain. Treat with nsaid and muscle relaxer. Hand numbness will monitor, likely CTS. If worsens will refer for EMG testing.  - cyclobenzaprine (FLEXERIL) 10 MG tablet; Take 1 tablet (10 mg) by mouth 3 times daily as needed for muscle spasms  Dispense: 30 tablet; Refill: 3  - diclofenac (VOLTAREN) 75 MG EC tablet; Take 1 tablet (75 mg) by mouth 2 times daily as needed for moderate pain  Dispense: 60 tablet; Refill: 3    Return in about 6 months (around 11/12/2020) for BP Recheck.      Phone call duration:  15 minutes    Gadiel Gamez MD, MD

## 2020-09-17 ENCOUNTER — VIRTUAL VISIT (OUTPATIENT)
Dept: FAMILY MEDICINE | Facility: CLINIC | Age: 35
End: 2020-09-17
Payer: COMMERCIAL

## 2020-09-17 DIAGNOSIS — Z20.822 SUSPECTED COVID-19 VIRUS INFECTION: ICD-10-CM

## 2020-09-17 DIAGNOSIS — Z20.822 SUSPECTED COVID-19 VIRUS INFECTION: Primary | ICD-10-CM

## 2020-09-17 PROCEDURE — U0003 INFECTIOUS AGENT DETECTION BY NUCLEIC ACID (DNA OR RNA); SEVERE ACUTE RESPIRATORY SYNDROME CORONAVIRUS 2 (SARS-COV-2) (CORONAVIRUS DISEASE [COVID-19]), AMPLIFIED PROBE TECHNIQUE, MAKING USE OF HIGH THROUGHPUT TECHNOLOGIES AS DESCRIBED BY CMS-2020-01-R: HCPCS | Performed by: PREVENTIVE MEDICINE

## 2020-09-17 PROCEDURE — 99213 OFFICE O/P EST LOW 20 MIN: CPT | Mod: 95 | Performed by: PREVENTIVE MEDICINE

## 2020-09-17 NOTE — PROGRESS NOTES
"Jean Cross is a 35 year old male who is being evaluated via a billable telephone visit.      The patient has been notified of following:     \"This telephone visit will be conducted via a call between you and your physician/provider. We have found that certain health care needs can be provided without the need for a physical exam.  This service lets us provide the care you need with a short phone conversation.  If a prescription is necessary we can send it directly to your pharmacy.  If lab work is needed we can place an order for that and you can then stop by our lab to have the test done at a later time.    Telephone visits are billed at different rates depending on your insurance coverage. During this emergency period, for some insurers they may be billed the same as an in-person visit.  Please reach out to your insurance provider with any questions.    If during the course of the call the physician/provider feels a telephone visit is not appropriate, you will not be charged for this service.\"    Patient has given verbal consent for Telephone visit?  Yes    What phone number would you like to be contacted at? [phone]    How would you like to obtain your AVS? Joey Magallanes     Jean Cross is a 35 year old male who presents via phone visit today for the following health issues:    HPI    Fever:    Started fever yesterday, last night was 101 F  Tylenol +  Symptoms started yesterday  Chills+  No cough  No shortness of breath  No sore throat  No diarrhea  No bowel changes  No rash  No ear pain  No rash  No joint pain   Body aches+  No emesis  No sick contacts  Had work being done in his basement and that worker tested positive  Patient wore mask and social distanced  Does not work in a health care setting  Wife is pregnant      Review of Systems   Constitutional, HEENT, cardiovascular, pulmonary, gi and gu systems are negative, except as otherwise noted.       Objective          Vitals:  No vitals were " "obtained today due to virtual visit.    healthy, alert and no distress  PSYCH: Alert and oriented times 3; coherent speech, normal   rate and volume, able to articulate logical thoughts, able   to abstract reason, no tangential thoughts, no hallucinations   or delusions  His affect is normal  RESP: No cough, no audible wheezing, able to talk in full sentences  Remainder of exam unable to be completed due to telephone visits    No results found for this or any previous visit (from the past 24 hour(s)).        Assessment/Plan:    Assessment & Plan     Diagnoses and all orders for this visit:    Suspected COVID-19 virus infection  -     Symptomatic COVID-19 Virus (Coronavirus) by PCR; Future    Discharge Instructions for COVID-19 Patients  You have--or may have--COVID-19. Please follow the instructions listed below.   If you have a weakened immune system, discuss with your doctor any other actions you need to take.  How can I protect others?  If you have symptoms (fever, cough, body aches or trouble breathing):    Stay home and away from others (self-isolate) until:  ? At least 10 days have passed since your symptoms started. And   ? You've had no fever--and no medicine that reduces fever--for 1 full day (24 hours). And   ? Your other symptoms have resolved (gotten better).  If you don't show symptoms, but testing showed that you have COVID-19:    Stay home and away from others (self-isolate) until at least 10 days have passed since the date of your first positive COVID-19 test.  During this time    Stay in your own room, even for meals. Use your own bathroom if you can.    Stay away from others in your home. No hugging, kissing or shaking hands. No visitors.    Don't go to work, school or anywhere else.    Clean \"high touch\" surfaces often (doorknobs, counters, handles). Use household cleaning spray or wipes. You'll find a full list of  on the EPA website: " www.epa.gov/pesticide-registration/list-n-disinfectants-use-against-sars-cov-2.    Cover your mouth and nose with a mask or other face covering to avoid spreading germs.    Wash your hands and face often. Use soap and water.    Caregivers in these groups are at risk for severe illness due to COVID-19:  ? People 65 years and older  ? People who live in a nursing home or long-term care facility  ? People with chronic disease (lung, heart, cancer, diabetes, kidney, liver, immunologic)  ? People who have a weakened immune system, including those who:    Are in cancer treatment    Take medicine that weakens the immune system, such as corticosteroids    Had a bone marrow or organ transplant    Have an immune deficiency    Have poorly controlled HIV or AIDS    Are obese (body mass index of 40 or higher)    Smoke regularly    Caregivers should wear gloves while washing dishes, handling laundry and cleaning bedrooms and bathrooms.    Use caution when washing and drying laundry: Don't shake dirty laundry and use the warmest water setting that you can.    For more tips on managing your health at home, go to www.cdc.gov/coronavirus/2019-ncov/downloads/10Things.pdf.  How can I take care of myself at home?  1. Get lots of rest. Drink extra fluids (unless a doctor has told you not to).  2. Take Tylenol (acetaminophen) for fever or pain. If you have liver or kidney problems, ask your family doctor if it's okay to take Tylenol.     Adults can take either:  ? 650 mg (two 325 mg pills) every 4 to 6 hours, or   ? 1,000 mg (two 500 mg pills) every 8 hours as needed.  ? Note: Don't take more than 3,000 mg in one day. Acetaminophen is found in many medicines (both prescribed and over-the-counter medicines). Read all labels to be sure you don't take too much.   For children, check the Tylenol bottle for the right dose. The dose is based on the child's age or weight.  3. If you have other health problems (like cancer, heart failure, an  organ transplant or severe kidney disease): Call your specialty clinic if you don't feel better in the next 2 days.  4. Know when to call 911. Emergency warning signs include:  ? Trouble breathing or shortness of breath  ? Pain or pressure in the chest that doesn't go away  ? Feeling confused like you haven't felt before, or not being able to wake up  ? Bluish-colored lips or face  5. Your doctor may have prescribed a blood thinner medicine. Follow their instructions.  Where can I get more information?    [facility] - About COVID-19: [email or web address]    CDC - What to Do If You're Sick: www.cdc.gov/coronavirus/2019-ncov/about/steps-when-sick.html    CDC - Ending Home Isolation: www.cdc.gov/coronavirus/2019-ncov/hcp/disposition-in-home-patients.html    CDC - Caring for Someone: www.cdc.gov/coronavirus/2019-ncov/if-you-are-sick/care-for-someone.html    [facility] - Interim Guidance for Hospital Discharge to Home: www.[facility].[place]./diseases/coronavirus/hcp/hospdischarge.pdf    [facility] clinical trials (COVID-19 research studies): clinicalaffairs.[facility]-clinical-trials    Below are the COVID-19 hotlines at the [place] Department of Health ([facility]). Interpreters are available.  ? For health questions: Call 534-713-5433 or 1-564.602.8267 (7 a.m. to 7 p.m.)  ? For questions about schools and childcare: Call 054-618-8978 or 1-932.376.5001 (7 a.m. to 7 p.m.)    For informational purposes only. Not to replace the advice of your health care provider. Clinically reviewed by the Infection Prevention Team. Copyright   2020 [employer]. All rights reserved. [employer] [id number] - REV 08/04/20.           See Patient Instructions    Return in about 3 days (around 9/20/2020) if symptoms worsen or fail to improve.    Ayesha Thomason MD MPH    [facility]    Phone call duration:  12 minutes

## 2020-09-17 NOTE — PATIENT INSTRUCTIONS
"Discharge Instructions for COVID-19 Patients  You have--or may have--COVID-19. Please follow the instructions listed below.   If you have a weakened immune system, discuss with your doctor any other actions you need to take.  How can I protect others?  If you have symptoms (fever, cough, body aches or trouble breathing):    Stay home and away from others (self-isolate) until:  ? At least 10 days have passed since your symptoms started. And   ? You've had no fever--and no medicine that reduces fever--for 1 full day (24 hours). And   ? Your other symptoms have resolved (gotten better).  If you don't show symptoms, but testing showed that you have COVID-19:    Stay home and away from others (self-isolate) until at least 10 days have passed since the date of your first positive COVID-19 test.  During this time    Stay in your own room, even for meals. Use your own bathroom if you can.    Stay away from others in your home. No hugging, kissing or shaking hands. No visitors.    Don't go to work, school or anywhere else.    Clean \"high touch\" surfaces often (doorknobs, counters, handles). Use household cleaning spray or wipes. You'll find a full list of  on the EPA website: www.epa.gov/pesticide-registration/list-n-disinfectants-use-against-sars-cov-2.    Cover your mouth and nose with a mask or other face covering to avoid spreading germs.    Wash your hands and face often. Use soap and water.    Caregivers in these groups are at risk for severe illness due to COVID-19:  ? People 65 years and older  ? People who live in a nursing home or long-term care facility  ? People with chronic disease (lung, heart, cancer, diabetes, kidney, liver, immunologic)  ? People who have a weakened immune system, including those who:    Are in cancer treatment    Take medicine that weakens the immune system, such as corticosteroids    Had a bone marrow or organ transplant    Have an immune deficiency    Have poorly controlled HIV or " AIDS    Are obese (body mass index of 40 or higher)    Smoke regularly    Caregivers should wear gloves while washing dishes, handling laundry and cleaning bedrooms and bathrooms.    Use caution when washing and drying laundry: Don't shake dirty laundry and use the warmest water setting that you can.    For more tips on managing your health at home, go to www.cdc.gov/coronavirus/2019-ncov/downloads/10Things.pdf.  How can I take care of myself at home?  1. Get lots of rest. Drink extra fluids (unless a doctor has told you not to).  2. Take Tylenol (acetaminophen) for fever or pain. If you have liver or kidney problems, ask your family doctor if it's okay to take Tylenol.     Adults can take either:  ? 650 mg (two 325 mg pills) every 4 to 6 hours, or   ? 1,000 mg (two 500 mg pills) every 8 hours as needed.  ? Note: Don't take more than 3,000 mg in one day. Acetaminophen is found in many medicines (both prescribed and over-the-counter medicines). Read all labels to be sure you don't take too much.   For children, check the Tylenol bottle for the right dose. The dose is based on the child's age or weight.  3. If you have other health problems (like cancer, heart failure, an organ transplant or severe kidney disease): Call your specialty clinic if you don't feel better in the next 2 days.  4. Know when to call 911. Emergency warning signs include:  ? Trouble breathing or shortness of breath  ? Pain or pressure in the chest that doesn't go away  ? Feeling confused like you haven't felt before, or not being able to wake up  ? Bluish-colored lips or face  5. Your doctor may have prescribed a blood thinner medicine. Follow their instructions.  Where can I get more information?     [employer] - About COVID-19: [email or web address]    CDC - What to Do If You're Sick: www.cdc.gov/coronavirus/2019-ncov/about/steps-when-sick.html    CDC - Ending Home Isolation:  www.cdc.gov/coronavirus/2019-ncov/hcp/disposition-in-home-patients.html    CDC - Caring for Someone: www.cdc.gov/coronavirus/2019-ncov/if-you-are-sick/care-for-someone.html    [facility] - Interim Guidance for Hospital Discharge to Home: www.[facility].[place].us/diseases/coronavirus/hcp/hospdischarge.pdf    [facility] clinical trials (COVID-19 research studies): clinicalaffairs.[facility]-clinical-trials    Below are the COVID-19 hotlines at the [place] Department of Health ([facility]). Interpreters are available.  ? For health questions: Call 384-686-5819 or 1-615.172.9532 (7 a.m. to 7 p.m.)  ? For questions about schools and childcare: Call 211-502-8659 or 1-444.699.4113 (7 a.m. to 7 p.m.)    For informational purposes only. Not to replace the advice of your health care provider. Clinically reviewed by the Infection Prevention Team. Copyright   2020 [facility]. All rights reserved. [employer] [id number] - REV 08/04/20.

## 2020-09-18 LAB
SARS-COV-2 RNA SPEC QL NAA+PROBE: ABNORMAL
SPECIMEN SOURCE: ABNORMAL

## 2020-09-19 ENCOUNTER — TELEPHONE (OUTPATIENT)
Dept: FAMILY MEDICINE | Facility: CLINIC | Age: 35
End: 2020-09-19

## 2020-09-19 NOTE — TELEPHONE ENCOUNTER
"Coronavirus (COVID-19) Notification    Caller Name (Patient, parent, daughter/son, grandparent, etc)  Jean    Reason for call  Notify of Positive Coronavirus (COVID-19) lab results, assess symptoms,  review [facility] recommendations    Lab Result    Lab test:  2019-nCoV rRt-PCR or SARS-CoV-2 PCR    Oropharyngeal AND/OR nasopharyngeal swabs is POSITIVE for 2019-nCoV RNA/SARS-COV-2 PCR (COVID-19 virus)    RN Recommendations/Instructions per [facility] Coronavirus COVID-19 recommendations    Brief introduction script  Introduce self then review script:  \"I am calling on behalf of [employer] [facility].  We were notified that your Coronavirus test (COVID-19) for was POSITIVE for the virus.  I have some information to relay to you but first I wanted to mention that the [place] Dept of Health will be contacting you shortly [it's possible MD already called Patient] to talk to you more about how you are feeling and other people you have had contact with who might now also have the virus.  Also, [facility] is Partnering with the [facility] for Covid-19 research, you may be contacted directly by research staff.\"    Assessment (Inquire about Patient's current symptoms)   Assessment   Current Symptoms at time of phone call: (if no symptoms, document No symptoms] Fever, chest heaviness   Symptoms onset (if applicable) Onset 9/16     If at time of call, Patients symptoms hare worsened, the Patient should contact 911 or have someone drive them to Emergency Dept promptly:      If Patient calling 911, inform 911 personal that you have tested positive for the Coronavirus (COVID-19).  Place mask on and await 911 to arrive.    If Emergency Dept, If possible, please have another adult drive you to the Emergency Dept but you need to wear mask when in contact with other people.      Review information with Patient    Your result was positive. This means you have COVID-19 (coronavirus).  We have sent you a letter that " reviews the information that I'll be reviewing with you now.    How can I protect others?    If you have symptoms: stay home and away from others (self-isolate) until:    You've had no fever--and no medicine that reduces fever--for 1 full day (24 hours). And       Your other symptoms have gotten better. For example, your cough or breathing has improved. And     At least 10 days have passed since your symptoms started. (If you've been told by a doctor that you have a weak immune system, wait 20 days.)     If you don't have symptoms: Stay home and away from others (self-isolate) until at least 10 days have passed since your first positive COVID-19 test. (Date test collected)    During this time:    Stay in your own room, including for meals. Use your own bathroom if you can.    Stay away from others in your home. No hugging, kissing or shaking hands. No visitors.     Don't go to work, school or anywhere else.     Clean  high touch  surfaces often (doorknobs, counters, handles, etc.). Use a household cleaning spray or wipes. You'll find a full list on the EPA website at www.epa.gov/pesticide-registration/list-n-disinfectants-use-against-sars-cov-2.     Cover your mouth and nose with a mask, tissue or other face covering to avoid spreading germs.    Wash your hands and face often with soap and water.    Caregivers in these groups are at risk for severe illness due to COVID-19:  o People 65 years and older  o People who live in a nursing home or long-term care facility  o People with chronic disease (lung, heart, cancer, diabetes, kidney, liver, immunologic)  o People who have a weakened immune system, including those who:  - Are in cancer treatment  - Take medicine that weakens the immune system, such as corticosteroids  - Had a bone marrow or organ transplant  - Have an immune deficiency  - Have poorly controlled HIV or AIDS  - Are obese (body mass index of 40 or higher)  - Smoke regularly    Caregivers should wear  gloves while washing dishes, handling laundry and cleaning bedrooms and bathrooms.    Wash and dry laundry with special caution. Don't shake dirty laundry, and use the warmest water setting you can.    If you have a weakened immune system, ask your doctor about other actions you should take.    For more tips, go to www.cdc.gov/coronavirus/2019-ncov/downloads/10Things.pdf.    You should not go back to work until you meet the guidelines above for ending your home isolation. You should meet these along with any other guidelines that your employer has.    Employers: This document serves as formal notice of your employee's medical guidelines for going back to work. They must meet the above guidelines before going back to work in person.    How can I take care of myself?    1. Get lots of rest. Drink extra fluids (unless a doctor has told you not to).    2. Take Tylenol (acetaminophen) for fever or pain. If you have liver or kidney problems, ask your family doctor if it's okay to take Tylenol.     Take either:     650 mg (two 325 mg pills) every 4 to 6 hours, or     1,000 mg (two 500 mg pills) every 8 hours as needed.     Note: Don't take more than 3,000 mg in one day. Acetaminophen is found in many medicines (both prescribed and over-the-counter medicines). Read all labels to be sure you don't take too much.    For children, check the Tylenol bottle for the right dose (based on their age or weight).    3. If you have other health problems (like cancer, heart failure, an organ transplant or severe kidney disease): Call your specialty clinic if you don't feel better in the next 2 days.    4. Know when to call 911: Emergency warning signs include:    Trouble breathing or shortness of breath    Pain or pressure in the chest that doesn't go away    Feeling confused like you haven't felt before, or not being able to wake up    Bluish-colored lips or face    5. Sign up for [employer]. We know it's scary to hear that you have  COVID-19. We want to track your symptoms to make sure you're okay over the next 2 weeks. Please look for an email from "[employer]--this is a free, online program that we'll use to keep in touch. To sign up, follow the link in the email. Learn more at [email or web address].    Where can I get more information?    [facility]: [email or web address]    Coronavirus Basics: www.health.[facility].[place]./diseases/coronavirus/basics.html    What to Do If You're Sick: www.cdc.gov/coronavirus/2019-ncov/about/steps-when-sick.html    Ending Home Isolation: www.cdc.gov/coronavirus/2019-ncov/hcp/disposition-in-home-patients.html     Caring for Someone with COVID-19: www.cdc.gov/coronavirus/2019-ncov/if-you-are-sick/care-for-someone.html     [facility] clinical trials (COVID-19 research studies): clinicalaffairs.[facility]-clinical-trials     A Positive COVID-19 letter will be sent via [employer] or the mail. (Exception, no letters sent to Presurgerical/Preprocedure Patients)    [Name]  Tiarra Suazo RN

## 2020-09-23 ENCOUNTER — TELEPHONE (OUTPATIENT)
Dept: FAMILY MEDICINE | Facility: CLINIC | Age: 35
End: 2020-09-23

## 2020-09-23 DIAGNOSIS — U07.1 INFECTION DUE TO 2019 NOVEL CORONAVIRUS: Primary | ICD-10-CM

## 2020-09-23 NOTE — TELEPHONE ENCOUNTER
.Reason for Call:  Following up after testing positive for covid    Detailed comments: Patient tested positive/got results on 09-18; Patient wanted to do a virtual visit but he needed it this week- opted for message,  states he just has a couple of questions which he would like to ask Dr Gamez personally;     Phone Number Patient can be reached at: Home number on file [phone] (home)    Best Time: anytime    Can we leave a detailed message on this number? YES    Call taken on 9/23/2020 at 1:59 PM by Vicki Zafar

## 2020-09-24 RX ORDER — AZITHROMYCIN 250 MG/1
TABLET, FILM COATED ORAL
Qty: 6 TABLET | Refills: 0 | Status: SHIPPED | OUTPATIENT
Start: 2020-09-24 | End: 2020-09-29

## 2020-09-24 RX ORDER — DEXAMETHASONE 6 MG/1
6 TABLET ORAL DAILY
Qty: 7 TABLET | Refills: 0 | Status: SHIPPED | OUTPATIENT
Start: 2020-09-24 | End: 2020-10-22

## 2020-09-24 NOTE — TELEPHONE ENCOUNTER
Reason for Call:  Other Symptoms    Detailed comments: Pt calling to follow up on his phone call from yesterday regarding questions related to his COVID-19 symptoms and did not receive a call back. He would like a call back as soon as possible today to address.    Phone Number Patient can be reached at: Home number on file [phone] (home)    Best Time: anytime    Can we leave a detailed message on this number? YES    Call taken on 9/24/2020 at 11:57 AM by Kelvin Abdi

## 2020-09-24 NOTE — TELEPHONE ENCOUNTER
He can send me a My Chart message with his questions and I would be happy to address them as best as I can. He can also provide his questions by phone to our RN team members, then can forward them to me.    Thank you,  Ayesha Thomason MD MPH

## 2020-09-24 NOTE — TELEPHONE ENCOUNTER
Patient stated he was recently diagnosed with COVID-19. Patient having sob especially with exertion. Fever is coming down per patient. Will treat patient with dexamethasone for 7 days. Patient given azithromycin for prophylaxis for secondary bacterial infection. Discussed s/s or worsening respiratory status and when needing to see higher medication care in ER and hospital. Patient understands and agrees with plan.    Gadiel Gamez MD

## 2020-09-24 NOTE — TELEPHONE ENCOUNTER
This writer attempted to contact Jean on 09/24/20      Reason for call questions and left message.      If patient calls back:   Registered Nurse called. Follow Triage Call workflow        Kaleigh Mackenzie RN

## 2020-10-22 ENCOUNTER — OFFICE VISIT (OUTPATIENT)
Dept: FAMILY MEDICINE | Facility: CLINIC | Age: 35
End: 2020-10-22
Payer: COMMERCIAL

## 2020-10-22 VITALS
TEMPERATURE: 98.7 F | HEIGHT: 71 IN | OXYGEN SATURATION: 100 % | WEIGHT: 237.9 LBS | SYSTOLIC BLOOD PRESSURE: 120 MMHG | BODY MASS INDEX: 33.31 KG/M2 | DIASTOLIC BLOOD PRESSURE: 82 MMHG | HEART RATE: 79 BPM

## 2020-10-22 DIAGNOSIS — Z13.1 SCREENING FOR DIABETES MELLITUS: ICD-10-CM

## 2020-10-22 DIAGNOSIS — Z00.00 ROUTINE HISTORY AND PHYSICAL EXAMINATION OF ADULT: Primary | ICD-10-CM

## 2020-10-22 DIAGNOSIS — I10 ESSENTIAL HYPERTENSION WITH GOAL BLOOD PRESSURE LESS THAN 140/90: ICD-10-CM

## 2020-10-22 DIAGNOSIS — Z23 ENCOUNTER FOR IMMUNIZATION: ICD-10-CM

## 2020-10-22 DIAGNOSIS — Z13.6 CARDIOVASCULAR SCREENING; LDL GOAL LESS THAN 130: ICD-10-CM

## 2020-10-22 LAB
ALBUMIN SERPL-MCNC: 4.3 G/DL (ref 3.4–5)
ALP SERPL-CCNC: 125 U/L (ref 40–150)
ALT SERPL W P-5'-P-CCNC: 82 U/L (ref 0–70)
ANION GAP SERPL CALCULATED.3IONS-SCNC: 5 MMOL/L (ref 3–14)
AST SERPL W P-5'-P-CCNC: 35 U/L (ref 0–45)
BILIRUB SERPL-MCNC: 0.7 MG/DL (ref 0.2–1.3)
BUN SERPL-MCNC: 9 MG/DL (ref 7–30)
CALCIUM SERPL-MCNC: 9.4 MG/DL (ref 8.5–10.1)
CHLORIDE SERPL-SCNC: 102 MMOL/L (ref 94–109)
CHOLEST SERPL-MCNC: 212 MG/DL
CO2 SERPL-SCNC: 29 MMOL/L (ref 20–32)
CREAT SERPL-MCNC: 0.9 MG/DL (ref 0.66–1.25)
GFR SERPL CREATININE-BSD FRML MDRD: >90 ML/MIN/{1.73_M2}
GLUCOSE SERPL-MCNC: 93 MG/DL (ref 70–99)
HDLC SERPL-MCNC: 55 MG/DL
LDLC SERPL CALC-MCNC: 135 MG/DL
NONHDLC SERPL-MCNC: 157 MG/DL
POTASSIUM SERPL-SCNC: 4.4 MMOL/L (ref 3.4–5.3)
PROT SERPL-MCNC: 8.1 G/DL (ref 6.8–8.8)
SODIUM SERPL-SCNC: 136 MMOL/L (ref 133–144)
TRIGL SERPL-MCNC: 110 MG/DL

## 2020-10-22 PROCEDURE — 90471 IMMUNIZATION ADMIN: CPT | Performed by: FAMILY MEDICINE

## 2020-10-22 PROCEDURE — 80061 LIPID PANEL: CPT | Performed by: FAMILY MEDICINE

## 2020-10-22 PROCEDURE — 99395 PREV VISIT EST AGE 18-39: CPT | Mod: 25 | Performed by: FAMILY MEDICINE

## 2020-10-22 PROCEDURE — 90472 IMMUNIZATION ADMIN EACH ADD: CPT | Performed by: FAMILY MEDICINE

## 2020-10-22 PROCEDURE — 36415 COLL VENOUS BLD VENIPUNCTURE: CPT | Performed by: FAMILY MEDICINE

## 2020-10-22 PROCEDURE — 90715 TDAP VACCINE 7 YRS/> IM: CPT | Performed by: FAMILY MEDICINE

## 2020-10-22 PROCEDURE — 82043 UR ALBUMIN QUANTITATIVE: CPT | Performed by: FAMILY MEDICINE

## 2020-10-22 PROCEDURE — 90682 RIV4 VACC RECOMBINANT DNA IM: CPT | Performed by: FAMILY MEDICINE

## 2020-10-22 PROCEDURE — 80053 COMPREHEN METABOLIC PANEL: CPT | Performed by: FAMILY MEDICINE

## 2020-10-22 RX ORDER — LOSARTAN POTASSIUM 25 MG/1
TABLET ORAL
Qty: 90 TABLET | Refills: 3 | Status: SHIPPED | OUTPATIENT
Start: 2020-10-22 | End: 2021-10-21

## 2020-10-22 ASSESSMENT — MIFFLIN-ST. JEOR: SCORE: 2018.3

## 2020-10-22 NOTE — NURSING NOTE
Prior to immunization administration, verified patients identity using patient s name and date of birth. Please see Immunization Activity for additional information.     Screening Questionnaire for Pediatric Immunization    Is the child sick today?   No   Does the child have allergies to medications, food, a vaccine component, or latex?   No   Has the child had a serious reaction to a vaccine in the past?   No   Does the child have a long-term health problem with lung, heart, kidney or metabolic disease (e.g., diabetes), asthma, a blood disorder, no spleen, complement component deficiency, a cochlear implant, or a spinal fluid leak?  Is he/she on long-term aspirin therapy?   No   If the child to be vaccinated is 2 through 4 years of age, has a healthcare provider told you that the child had wheezing or asthma in the  past 12 months?   No   If your child is a baby, have you ever been told he or she has had intussusception?   No   Has the child, sibling or parent had a seizure, has the child had brain or other nervous system problems?   No   Does the child have cancer, leukemia, AIDS, or any immune system         problem?   No   Does the child have a parent, brother, or sister with an immune system problem?   No   In the past 3 months, has the child taken medications that affect the immune system such as prednisone, other steroids, or anticancer drugs; drugs for the treatment of rheumatoid arthritis, Crohn s disease, or psoriasis; or had radiation treatments?   No   In the past year, has the child received a transfusion of blood or blood products, or been given immune (gamma) globulin or an antiviral drug?   No   Is the child/teen pregnant or is there a chance that she could become       pregnant during the next month?   No   Has the child received any vaccinations in the past 4 weeks?   No      Immunization questionnaire answers were all negative.          Per orders of Dr. SUMNER, injection of Tdap and flu given by  Tabby Jin MA. Patient instructed to remain in clinic for 15 minutes afterwards, and to report any adverse reaction to me immediately.    Screening performed by Tabby Jin MA on 10/22/2020 at 5:44 PM.

## 2020-10-22 NOTE — PROGRESS NOTES
3  SUBJECTIVE:   CC: Jean Cross is an 35 year old male who presents for preventive health visit.       Patient has been advised of split billing requirements and indicates understanding: Yes  Healthy Habits:    Do you get at least three servings of calcium containing foods daily (dairy, green leafy vegetables, etc.)? yes    Amount of exercise or daily activities, outside of work: 4 day(s) per week    Problems taking medications regularly No    Medication side effects: No    Have you had an eye exam in the past two years? yes    Do you see a dentist twice per year? yes    Do you have sleep apnea, excessive snoring or daytime drowsiness?no    Today's PHQ-2 Score:   PHQ-2 ( 1999 Pfizer) 10/22/2020 11/14/2018   Q1: Little interest or pleasure in doing things 0 0   Q2: Feeling down, depressed or hopeless 0 0   PHQ-2 Score 0 0   Q1: Little interest or pleasure in doing things - -   Q2: Feeling down, depressed or hopeless - -   PHQ-2 Score - -       Abuse: Current or Past(Physical, Sexual or Emotional)- No  Do you feel safe in your environment? Yes        Social History     Tobacco Use     Smoking status: Never Smoker     Smokeless tobacco: Never Used   Substance Use Topics     Alcohol use: Yes     Comment: 2-3 drinks every month      If you drink alcohol do you typically have >3 drinks per day or >7 drinks per week? No                      Last PSA: No results found for: PSA    Reviewed orders with patient. Reviewed health maintenance and updated orders accordingly - Yes  Lab work is in process  Labs reviewed in EPIC  BP Readings from Last 3 Encounters:   10/22/20 120/82   08/22/19 116/80   05/17/19 128/79    Wt Readings from Last 3 Encounters:   10/22/20 107.9 kg (237 lb 14.4 oz)   08/22/19 114.3 kg (252 lb)   05/17/19 112.9 kg (249 lb)                  Patient Active Problem List   Diagnosis     CARDIOVASCULAR SCREENING; LDL GOAL LESS THAN 160     CHONDROMALACIA, KNEE - right     MEDIAL MENISCUS TEAR - right      Obesity, Class I, BMI 30-34.9     Renal cyst     Mesenteric lymphadenitis     Paroxysmal supraventricular tachycardia (H)     Latent tuberculosis by blood test     Essential hypertension with goal blood pressure less than 140/90     Past Surgical History:   Procedure Laterality Date     COLONOSCOPY WITH CO2 INSUFFLATION N/A 8/5/2016    Procedure: COLONOSCOPY WITH CO2 INSUFFLATION;  Surgeon: Duane, William Charles, MD;  Location: [facility] OR     COMBINED ESOPHAGOSCOPY, GASTROSCOPY, DUODENOSCOPY (EGD) WITH CO2 INSUFFLATION N/A 6/29/2016    Procedure: COMBINED ESOPHAGOSCOPY, GASTROSCOPY, DUODENOSCOPY (EGD) WITH CO2 INSUFFLATION;  Surgeon: Duane, William Charles, MD;  Location: [facility] OR     ESOPHAGOSCOPY, GASTROSCOPY, DUODENOSCOPY (EGD), COMBINED N/A 6/29/2016    Procedure: COMBINED ESOPHAGOSCOPY, GASTROSCOPY, DUODENOSCOPY (EGD), BIOPSY SINGLE OR MULTIPLE;  Surgeon: Duane, William Charles, MD;  Location: [facility] OR     HC CAPSULE ENDOSCOPY N/A 3/27/2017    Procedure: CAPSULE/PILL CAM ENDOSCOPY;  Surgeon: Chavez Mirza MD;  [facility]       Social History     Tobacco Use     Smoking status: Never Smoker     Smokeless tobacco: Never Used   Substance Use Topics     Alcohol use: Yes     Comment: 2-3 drinks every month      Family History   Problem Relation Age of Onset     Hypertension Mother      Hypertension Father      Diabetes Father      Cerebrovascular Disease Maternal Grandmother      Cancer Maternal Grandfather         throat cancer     Arthritis Paternal Grandmother          Current Outpatient Medications   Medication Sig Dispense Refill     losartan (COZAAR) 25 MG tablet Take 1 tablet by mouth once daily for blood pressure 90 tablet 3     No Known Allergies    Reviewed and updated as needed this visit by clinical staff                 Reviewed and updated as needed this visit by Provider                    ROS:  CONSTITUTIONAL: NEGATIVE for fever, chills, change in weight  INTEGUMENTARY/SKIN: NEGATIVE for worrisome  "rashes, moles or lesions  EYES: NEGATIVE for vision changes or irritation  ENT: NEGATIVE for ear, mouth and throat problems  RESP: NEGATIVE for significant cough or SOB  CV: NEGATIVE for chest pain, palpitations or peripheral edema  GI: NEGATIVE for nausea, abdominal pain, heartburn, or change in bowel habits   male: negative for dysuria, hematuria, decreased urinary stream, erectile dysfunction, urethral discharge  MUSCULOSKELETAL: NEGATIVE for significant arthralgias or myalgia  NEURO: NEGATIVE for weakness, dizziness or paresthesias  PSYCHIATRIC: NEGATIVE for changes in mood or affect    OBJECTIVE:   /82   Pulse 79   Temp 98.7  F (37.1  C)   Ht 1.791 m (5' 10.5\")   Wt 107.9 kg (237 lb 14.4 oz)   SpO2 100%   BMI 33.65 kg/m    EXAM:  GENERAL: healthy, alert and no distress  NECK: no adenopathy, no asymmetry, masses, or scars and thyroid normal to palpation  RESP: lungs clear to auscultation - no rales, rhonchi or wheezes  CV: regular rate and rhythm, normal S1 S2, no S3 or S4, no murmur, click or rub, no peripheral edema and peripheral pulses strong  ABDOMEN: soft, nontender, no hepatosplenomegaly, no masses and bowel sounds normal  MS: no gross musculoskeletal defects noted, no edema    Diagnostic Test Results:  Labs reviewed in Epic    ASSESSMENT/PLAN:   1. Routine history and physical examination of adult  As below.    2. Essential hypertension with goal blood pressure less than 140/90  Controlled. RTC in 6 months.  - Comprehensive metabolic panel (BMP + Alb, Alk Phos, ALT, AST, Total. Bili, TP)  - Albumin Random Urine Quantitative with Creat Ratio  - losartan (COZAAR) 25 MG tablet; Take 1 tablet by mouth once daily for blood pressure  Dispense: 90 tablet; Refill: 3    3. CARDIOVASCULAR SCREENING; LDL GOAL LESS THAN 130    - Comprehensive metabolic panel (BMP + Alb, Alk Phos, ALT, AST, Total. Bili, TP)  - Lipid panel reflex to direct LDL Non-fasting    4. Screening for diabetes mellitus    - " "Comprehensive metabolic panel (BMP + Alb, Alk Phos, ALT, AST, Total. Bili, TP)    5. Encounter for immunization    - TDAP VACCINE (Adacel, Boostrix)  [[id number]]  - FLU VAC, QUADRIVALENT (RIV4) RECOMBINANT DNA, IM  - ADMIN 1st VACCINE  - EA ADD'L VACCINE    Patient has been advised of split billing requirements and indicates understanding: Yes  COUNSELING:  Reviewed preventive health counseling, as reflected in patient instructions       Regular exercise       Healthy diet/nutrition       Vision screening    Estimated body mass index is 33.65 kg/m  as calculated from the following:    Height as of this encounter: 1.791 m (5' 10.5\").    Weight as of this encounter: 107.9 kg (237 lb 14.4 oz).        He reports that he has never smoked. He has never used smokeless tobacco.      Counseling Resources:  ATP IV Guidelines  Pooled Cohorts Equation Calculator  FRAX Risk Assessment  ICSI Preventive Guidelines  Dietary Guidelines for Americans, 2010  USDA's MyPlate  ASA Prophylaxis  Lung CA Screening    Gadiel Gamez MD, MD  [facility]  "

## 2020-10-23 LAB
CREAT UR-MCNC: 111 MG/DL
MICROALBUMIN UR-MCNC: 8 MG/L
MICROALBUMIN/CREAT UR: 7.31 MG/G CR (ref 0–17)

## 2021-03-31 ENCOUNTER — IMMUNIZATION (OUTPATIENT)
Dept: PEDIATRICS | Facility: CLINIC | Age: 36
End: 2021-03-31
Payer: COMMERCIAL

## 2021-03-31 PROCEDURE — 0001A PR COVID VAC PFIZER DIL RECON 30 MCG/0.3 ML IM: CPT

## 2021-03-31 PROCEDURE — 91300 PR COVID VAC PFIZER DIL RECON 30 MCG/0.3 ML IM: CPT

## 2021-04-21 ENCOUNTER — IMMUNIZATION (OUTPATIENT)
Dept: PEDIATRICS | Facility: CLINIC | Age: 36
End: 2021-04-21
Attending: INTERNAL MEDICINE
Payer: COMMERCIAL

## 2021-04-21 PROCEDURE — 91300 PR COVID VAC PFIZER DIL RECON 30 MCG/0.3 ML IM: CPT

## 2021-04-21 PROCEDURE — 0002A PR COVID VAC PFIZER DIL RECON 30 MCG/0.3 ML IM: CPT

## 2021-07-14 ENCOUNTER — OFFICE VISIT (OUTPATIENT)
Dept: FAMILY MEDICINE | Facility: CLINIC | Age: 36
End: 2021-07-14
Payer: COMMERCIAL

## 2021-07-14 VITALS
BODY MASS INDEX: 35.42 KG/M2 | OXYGEN SATURATION: 98 % | RESPIRATION RATE: 20 BRPM | SYSTOLIC BLOOD PRESSURE: 123 MMHG | DIASTOLIC BLOOD PRESSURE: 82 MMHG | HEIGHT: 71 IN | HEART RATE: 76 BPM | WEIGHT: 253 LBS | TEMPERATURE: 98.1 F

## 2021-07-14 DIAGNOSIS — B07.0 PLANTAR WARTS: ICD-10-CM

## 2021-07-14 DIAGNOSIS — K40.90 RIGHT INGUINAL HERNIA: Primary | ICD-10-CM

## 2021-07-14 PROCEDURE — 99213 OFFICE O/P EST LOW 20 MIN: CPT | Performed by: FAMILY MEDICINE

## 2021-07-14 ASSESSMENT — PAIN SCALES - GENERAL: PAINLEVEL: MODERATE PAIN (4)

## 2021-07-14 ASSESSMENT — MIFFLIN-ST. JEOR: SCORE: 2081.79

## 2021-07-14 NOTE — PROGRESS NOTES
"    Sona Pena is a 36 year old who presents for the following health issues   HPI     Patient c/o right groin pain that started about 2 days ago. Initially walking increased pain. Resting helps. Today no pain. No urinary symptoms. Patient has not done any activities that may have caused injury to the groin area. Patient does have right foot, plantar warts that are current getting treated by Dermatology and painful causing difficulty with ambulation.    Review of Systems   Constitutional, HEENT, cardiovascular, pulmonary, GI, , musculoskeletal, neuro, skin, endocrine and psych systems are negative, except as otherwise noted.      Objective    /82 (BP Location: Left arm, Patient Position: Sitting, Cuff Size: Adult Large)   Pulse 76   Temp 98.1  F (36.7  C)   Resp 20   Ht 1.791 m (5' 10.5\")   Wt 114.8 kg (253 lb)   SpO2 98%   BMI 35.79 kg/m    Body mass index is 35.79 kg/m .  Physical Exam   GENERAL: healthy, alert and no distress  NECK: no adenopathy, no asymmetry, masses, or scars and thyroid normal to palpation  RESP: lungs clear to auscultation - no rales, rhonchi or wheezes  CV: regular rate and rhythm, normal S1 S2, no S3 or S4, no murmur, click or rub, no peripheral edema and peripheral pulses strong  ABDOMEN: soft, nontender, no hepatosplenomegaly, no masses and bowel sounds normal  MS: no gross musculoskeletal defects noted, no edema  : right inguinal canal area tenderness, possible mild bulging but not significant    A/P:  (K40.90) Right inguinal hernia  (primary encounter diagnosis)  Comment:   Plan: Adult General Surg Referral        Mild. No symptoms at this time. Discussed conservative therapy. Be careful with heavy lifting. If pain worsens, patient will see Surgery for further evaluation and recommendations.    (B07.0) Plantar warts  Comment:   Plan: Adult Dermatology Referral        Not improving. Patient will see [facility] for second opinion.    Gadiel Gamez MD      "

## 2021-07-14 NOTE — PATIENT INSTRUCTIONS
At [facility], we strive to deliver an exceptional experience to you, every time we see you. If you receive a survey, please complete it as we do value your feedback.  If you have MyChart, you can expect to receive results automatically within 24 hours of their completion.  Your provider will send a note interpreting your results as well.   If you do not have MyChart, you should receive your results in about a week by mail.    Your care team:                            Family Medicine Internal Medicine   MD Duglas Gresham MD Shantel Branch-Fleming, MD Srinivasa Vaka, MD Katya Belousova, PAAMADOR Peck, APRN CNP    Gadiel Gamez, MD Pediatrics   Tobin Herman, PAAMADOR Almazan, CNP MD Renee Lisa APRN CNP   MD Adelaide Paredes MD Deborah Mielke, MD Janelle Rhoades, APRN [facility]      Clinic hours: Monday - Thursday 7 am-6 pm; Fridays 7 am-5 pm.   Urgent care: Monday - Friday 10 am- 8 pm; Saturday and Sunday 9 am-5 pm.    Clinic: [phone]       [facility]: Monday - Thursday 8 am - 7 pm; Friday 8 am - 6 pm  [facility]: [phone]     Use www.[email or web address].org for 24/7 diagnosis and treatment of dozens of conditions.

## 2021-09-26 ENCOUNTER — HEALTH MAINTENANCE LETTER (OUTPATIENT)
Age: 36
End: 2021-09-26

## 2021-10-21 DIAGNOSIS — I10 ESSENTIAL HYPERTENSION WITH GOAL BLOOD PRESSURE LESS THAN 140/90: ICD-10-CM

## 2021-10-21 RX ORDER — LOSARTAN POTASSIUM 25 MG/1
TABLET ORAL
Qty: 90 TABLET | Refills: 0 | Status: SHIPPED | OUTPATIENT
Start: 2021-10-21 | End: 2022-01-25

## 2022-01-15 ENCOUNTER — HEALTH MAINTENANCE LETTER (OUTPATIENT)
Age: 37
End: 2022-01-15

## 2022-01-23 DIAGNOSIS — I10 ESSENTIAL HYPERTENSION WITH GOAL BLOOD PRESSURE LESS THAN 140/90: ICD-10-CM

## 2022-01-25 RX ORDER — LOSARTAN POTASSIUM 25 MG/1
TABLET ORAL
Qty: 90 TABLET | Refills: 0 | Status: SHIPPED | OUTPATIENT
Start: 2022-01-25 | End: 2022-03-17

## 2022-01-25 NOTE — TELEPHONE ENCOUNTER
"Requested Prescriptions   Pending Prescriptions Disp Refills     losartan (COZAAR) 25 MG tablet [Pharmacy Med Name: Losartan Potassium 25 MG Oral Tablet] 90 tablet 0     Sig: Take 1 tablet by mouth once daily for blood pressure       Angiotensin-II Receptors Failed - 1/23/2022  9:37 AM        Failed - Normal serum creatinine on file in past 12 months     Recent Labs   Lab Test 10/22/20  1748   CR 0.90       Ok to refill medication if creatinine is low          Failed - Normal serum potassium on file in past 12 months     Recent Labs   Lab Test 10/22/20  1748   POTASSIUM 4.4                    Passed - Last blood pressure under 140/90 in past 12 months     BP Readings from Last 3 Encounters:   07/14/21 123/82   10/22/20 120/82   08/22/19 116/80                 Passed - Recent (12 mo) or future (30 days) visit within the authorizing provider's specialty     Patient has had an office visit with the authorizing provider or a provider within the authorizing providers department within the previous 12 mos or has a future within next 30 days. See \"Patient Info\" tab in inbasket, or \"Choose Columns\" in Meds & Orders section of the refill encounter.              Passed - Medication is active on med list        Passed - Patient is age 18 or older             Radha DAVILAN, RN    "

## 2022-03-17 ENCOUNTER — OFFICE VISIT (OUTPATIENT)
Dept: FAMILY MEDICINE | Facility: CLINIC | Age: 37
End: 2022-03-17
Payer: COMMERCIAL

## 2022-03-17 VITALS
OXYGEN SATURATION: 98 % | BODY MASS INDEX: 35.82 KG/M2 | RESPIRATION RATE: 18 BRPM | TEMPERATURE: 97.9 F | HEIGHT: 70 IN | WEIGHT: 250.2 LBS | SYSTOLIC BLOOD PRESSURE: 132 MMHG | HEART RATE: 96 BPM | DIASTOLIC BLOOD PRESSURE: 82 MMHG

## 2022-03-17 DIAGNOSIS — Z13.6 CARDIOVASCULAR SCREENING; LDL GOAL LESS THAN 130: ICD-10-CM

## 2022-03-17 DIAGNOSIS — E66.01 MORBID OBESITY (H): ICD-10-CM

## 2022-03-17 DIAGNOSIS — I10 ESSENTIAL HYPERTENSION WITH GOAL BLOOD PRESSURE LESS THAN 140/90: ICD-10-CM

## 2022-03-17 DIAGNOSIS — N28.89 RIGHT RENAL MASS: ICD-10-CM

## 2022-03-17 DIAGNOSIS — Z00.00 ROUTINE GENERAL MEDICAL EXAMINATION AT A HEALTH CARE FACILITY: Primary | ICD-10-CM

## 2022-03-17 PROBLEM — I47.10 PAROXYSMAL SUPRAVENTRICULAR TACHYCARDIA (H): Status: RESOLVED | Noted: 2017-02-07 | Resolved: 2022-03-17

## 2022-03-17 PROCEDURE — 99395 PREV VISIT EST AGE 18-39: CPT | Performed by: FAMILY MEDICINE

## 2022-03-17 RX ORDER — LOSARTAN POTASSIUM 25 MG/1
TABLET ORAL
Qty: 90 TABLET | Refills: 3 | Status: SHIPPED | OUTPATIENT
Start: 2022-03-17 | End: 2023-02-16

## 2022-03-17 ASSESSMENT — ENCOUNTER SYMPTOMS
SORE THROAT: 0
ARTHRALGIAS: 0
FREQUENCY: 0
MYALGIAS: 1
EYE PAIN: 0
DIARRHEA: 0
PARESTHESIAS: 0
HEMATURIA: 0
NAUSEA: 0
HEARTBURN: 0
WEAKNESS: 0
COUGH: 0
JOINT SWELLING: 0
DIZZINESS: 0
NERVOUS/ANXIOUS: 0
CONSTIPATION: 1
DYSURIA: 0
FEVER: 0
HEMATOCHEZIA: 0
HEADACHES: 0
PALPITATIONS: 0
CHILLS: 0
SHORTNESS OF BREATH: 0
ABDOMINAL PAIN: 1

## 2022-03-17 NOTE — PROGRESS NOTES
SUBJECTIVE:   CC: Jean Cross is an 37 year old male who presents for preventative health visit.       Healthy Habits:     Getting at least 3 servings of Calcium per day:  Yes    Bi-annual eye exam:  NO    Dental care twice a year:  Yes    Sleep apnea or symptoms of sleep apnea:  None    Diet:  Regular (no restrictions)    Frequency of exercise:  None    Taking medications regularly:  Yes    Medication side effects:  None    PHQ-2 Total Score: 0    Additional concerns today:  No      Today's PHQ-2 Score:   PHQ-2 ( 1999 Pfizer) 3/17/2022   Q1: Little interest or pleasure in doing things 0   Q2: Feeling down, depressed or hopeless 0   PHQ-2 Score 0   PHQ-2 Total Score (12-17 Years)- Positive if 3 or more points; Administer PHQ-A if positive -   Q1: Little interest or pleasure in doing things Not at all   Q2: Feeling down, depressed or hopeless Not at all   PHQ-2 Score 0       Abuse: Current or Past(Physical, Sexual or Emotional)- No  Do you feel safe in your environment? Yes    Have you ever done Advance Care Planning? (For example, a Health Directive, POLST, or a discussion with a medical provider or your loved ones about your wishes): Yes, patient states has an Advance Care Planning document and will bring a copy to the clinic.    Social History     Tobacco Use     Smoking status: Never Smoker     Smokeless tobacco: Never Used   Substance Use Topics     Alcohol use: Yes     Comment: 2-3 drinks every month      If you drink alcohol do you typically have >3 drinks per day or >7 drinks per week? No    Alcohol Use 3/17/2022   Prescreen: >3 drinks/day or >7 drinks/week? No   Prescreen: >3 drinks/day or >7 drinks/week? -   No flowsheet data found.    Last PSA: No results found for: PSA    Reviewed orders with patient. Reviewed health maintenance and updated orders accordingly - Yes  Lab work is in process  Labs reviewed in EPIC  BP Readings from Last 3 Encounters:   03/17/22 132/82   07/14/21 123/82   10/22/20 120/82     Wt Readings from Last 3 Encounters:   03/17/22 113.5 kg (250 lb 3.2 oz)   07/14/21 114.8 kg (253 lb)   10/22/20 107.9 kg (237 lb 14.4 oz)                  Patient Active Problem List   Diagnosis     CARDIOVASCULAR SCREENING; LDL GOAL LESS THAN 160     CHONDROMALACIA, KNEE - right     MEDIAL MENISCUS TEAR - right     Obesity, Class I, BMI 30-34.9     Renal cyst     Mesenteric lymphadenitis     Latent tuberculosis by blood test     Essential hypertension with goal blood pressure less than 140/90     Morbid obesity (H)     Past Surgical History:   Procedure Laterality Date     COLONOSCOPY WITH CO2 INSUFFLATION N/A 8/5/2016    Procedure: COLONOSCOPY WITH CO2 INSUFFLATION;  Surgeon: Duane, William Charles, MD;  Location: [facility]     COMBINED ESOPHAGOSCOPY, GASTROSCOPY, DUODENOSCOPY (EGD) WITH CO2 INSUFFLATION N/A 6/29/2016    Procedure: COMBINED ESOPHAGOSCOPY, GASTROSCOPY, DUODENOSCOPY (EGD) WITH CO2 INSUFFLATION;  Surgeon: Duane, William Charles, MD;  Location: [facility]     ESOPHAGOSCOPY, GASTROSCOPY, DUODENOSCOPY (EGD), COMBINED N/A 6/29/2016    Procedure: COMBINED ESOPHAGOSCOPY, GASTROSCOPY, DUODENOSCOPY (EGD), BIOPSY SINGLE OR MULTIPLE;  Surgeon: Duane, William Charles, MD;  Location: [facility]     HC CAPSULE ENDOSCOPY N/A 3/27/2017    Procedure: CAPSULE/PILL CAM ENDOSCOPY;  Surgeon: Chavez Mirza MD;  Location:  GI       Social History     Tobacco Use     Smoking status: Never Smoker     Smokeless tobacco: Never Used   Substance Use Topics     Alcohol use: Yes     Comment: 2-3 drinks every month      Family History   Problem Relation Age of Onset     Hypertension Mother      Hypertension Father      Diabetes Father      Cerebrovascular Disease Maternal Grandmother      Cancer Maternal Grandfather         throat cancer     Arthritis Paternal Grandmother          Current Outpatient Medications   Medication Sig Dispense Refill     losartan (COZAAR) 25 MG tablet Take 1 tablet by mouth once daily for blood pressure 90  "tablet 3     No Known Allergies    Reviewed and updated as needed this visit by clinical staff   Tobacco  Allergies  Meds              Reviewed and updated as needed this visit by Provider                     Review of Systems  CONSTITUTIONAL: NEGATIVE for fever, chills, change in weight  INTEGUMENTARY/SKIN: NEGATIVE for worrisome rashes, moles or lesions  EYES: NEGATIVE for vision changes or irritation  ENT: NEGATIVE for ear, mouth and throat problems  RESP: NEGATIVE for significant cough or SOB  CV: NEGATIVE for chest pain, palpitations or peripheral edema  GI: NEGATIVE for nausea, abdominal pain, heartburn, or change in bowel habits   male: negative for dysuria, hematuria, decreased urinary stream, erectile dysfunction, urethral discharge  MUSCULOSKELETAL: NEGATIVE for significant arthralgias or myalgia  NEURO: NEGATIVE for weakness, dizziness or paresthesias  PSYCHIATRIC: NEGATIVE for changes in mood or affect    OBJECTIVE:   /82 (BP Location: Left arm, Patient Position: Sitting, Cuff Size: Adult Large)   Pulse 96   Temp 97.9  F (36.6  C) (Tympanic)   Resp 18   Ht 1.775 m (5' 9.88\")   Wt 113.5 kg (250 lb 3.2 oz)   SpO2 98%   BMI 36.02 kg/m      Physical Exam  GENERAL: healthy, alert and no distress  NECK: no adenopathy, no asymmetry, masses, or scars and thyroid normal to palpation  RESP: lungs clear to auscultation - no rales, rhonchi or wheezes  CV: regular rate and rhythm, normal S1 S2, no S3 or S4, no murmur, click or rub, no peripheral edema and peripheral pulses strong  ABDOMEN: soft, nontender, no hepatosplenomegaly, no masses and bowel sounds normal  MS: no gross musculoskeletal defects noted, no edema    Diagnostic Test Results:  Labs reviewed in Epic    ASSESSMENT/PLAN:   (Z00.00) Routine general medical examination at a health care facility  (primary encounter diagnosis)  Comment:   Plan: as below.    (I10) Essential hypertension with goal blood pressure less than 140/90  Comment: " "  Plan: Albumin Random Urine Quantitative with Creat         Ratio, Comprehensive metabolic panel (BMP +         Alb, Alk Phos, ALT, AST, Total. Bili, TP),         losartan (COZAAR) 25 MG tablet        Controlled. Continue with losartan.    (Z13.6) CARDIOVASCULAR SCREENING; LDL GOAL LESS THAN 130  Comment:   Plan: Lipid panel reflex to direct LDL Fasting,         Comprehensive metabolic panel (BMP + Alb, Alk         Phos, ALT, AST, Total. Bili, TP)            (E66.01) Morbid obesity (H)  Comment:   Plan: Lipid panel reflex to direct LDL Fasting,         Comprehensive metabolic panel (BMP + Alb, Alk         Phos, ALT, AST, Total. Bili, TP)        Discussed trying to get weight down. Patient will work on this.    (N28.89) Right renal mass  Comment:   Plan: US Renal Complete        subcentimeter density found on CT scan in 2017. Follow up.      COUNSELING:   Reviewed preventive health counseling, as reflected in patient instructions       Regular exercise       Healthy diet/nutrition       Vision screening    Estimated body mass index is 36.02 kg/m  as calculated from the following:    Height as of this encounter: 1.775 m (5' 9.88\").    Weight as of this encounter: 113.5 kg (250 lb 3.2 oz).         He reports that he has never smoked. He has never used smokeless tobacco.      Counseling Resources:  ATP IV Guidelines  Pooled Cohorts Equation Calculator  FRAX Risk Assessment  ICSI Preventive Guidelines  Dietary Guidelines for Americans, 2010  USDA's MyPlate  ASA Prophylaxis  Lung CA Screening    Gadiel Gamez MD, MD  [facility]  "

## 2022-03-25 ENCOUNTER — LAB (OUTPATIENT)
Dept: LAB | Facility: CLINIC | Age: 37
End: 2022-03-25
Payer: COMMERCIAL

## 2022-03-25 ENCOUNTER — ANCILLARY PROCEDURE (OUTPATIENT)
Dept: ULTRASOUND IMAGING | Facility: CLINIC | Age: 37
End: 2022-03-25
Attending: FAMILY MEDICINE
Payer: COMMERCIAL

## 2022-03-25 DIAGNOSIS — N28.89 RIGHT RENAL MASS: ICD-10-CM

## 2022-03-25 DIAGNOSIS — Z13.6 CARDIOVASCULAR SCREENING; LDL GOAL LESS THAN 130: ICD-10-CM

## 2022-03-25 DIAGNOSIS — E66.01 MORBID OBESITY (H): ICD-10-CM

## 2022-03-25 DIAGNOSIS — I10 ESSENTIAL HYPERTENSION WITH GOAL BLOOD PRESSURE LESS THAN 140/90: ICD-10-CM

## 2022-03-25 LAB
ALBUMIN SERPL-MCNC: 3.8 G/DL (ref 3.4–5)
ALP SERPL-CCNC: 93 U/L (ref 40–150)
ALT SERPL W P-5'-P-CCNC: 83 U/L (ref 0–70)
ANION GAP SERPL CALCULATED.3IONS-SCNC: 8 MMOL/L (ref 3–14)
AST SERPL W P-5'-P-CCNC: 37 U/L (ref 0–45)
BILIRUB SERPL-MCNC: 0.6 MG/DL (ref 0.2–1.3)
BUN SERPL-MCNC: 13 MG/DL (ref 7–30)
CALCIUM SERPL-MCNC: 9.5 MG/DL (ref 8.5–10.1)
CHLORIDE BLD-SCNC: 106 MMOL/L (ref 94–109)
CHOLEST SERPL-MCNC: 177 MG/DL
CO2 SERPL-SCNC: 25 MMOL/L (ref 20–32)
CREAT SERPL-MCNC: 1.03 MG/DL (ref 0.66–1.25)
CREAT UR-MCNC: 33 MG/DL
FASTING STATUS PATIENT QL REPORTED: YES
GFR SERPL CREATININE-BSD FRML MDRD: >90 ML/MIN/1.73M2
GLUCOSE BLD-MCNC: 120 MG/DL (ref 70–99)
HDLC SERPL-MCNC: 55 MG/DL
LDLC SERPL CALC-MCNC: 98 MG/DL
MICROALBUMIN UR-MCNC: 6 MG/L
MICROALBUMIN/CREAT UR: 18.18 MG/G CR (ref 0–17)
NONHDLC SERPL-MCNC: 122 MG/DL
POTASSIUM BLD-SCNC: 4 MMOL/L (ref 3.4–5.3)
PROT SERPL-MCNC: 7.6 G/DL (ref 6.8–8.8)
SODIUM SERPL-SCNC: 139 MMOL/L (ref 133–144)
TRIGL SERPL-MCNC: 121 MG/DL

## 2022-03-25 PROCEDURE — 82043 UR ALBUMIN QUANTITATIVE: CPT

## 2022-03-25 PROCEDURE — 80053 COMPREHEN METABOLIC PANEL: CPT

## 2022-03-25 PROCEDURE — 80061 LIPID PANEL: CPT

## 2022-03-25 PROCEDURE — 36415 COLL VENOUS BLD VENIPUNCTURE: CPT

## 2022-03-25 PROCEDURE — 76770 US EXAM ABDO BACK WALL COMP: CPT | Performed by: RADIOLOGY

## 2022-04-21 ENCOUNTER — MYC MEDICAL ADVICE (OUTPATIENT)
Dept: FAMILY MEDICINE | Facility: CLINIC | Age: 37
End: 2022-04-21
Payer: COMMERCIAL

## 2022-04-21 DIAGNOSIS — Z20.828 EXPOSURE TO INFLUENZA: Primary | ICD-10-CM

## 2022-04-21 RX ORDER — OSELTAMIVIR PHOSPHATE 75 MG/1
75 CAPSULE ORAL DAILY
Qty: 7 CAPSULE | Refills: 0 | Status: SHIPPED | OUTPATIENT
Start: 2022-04-21 | End: 2022-04-28

## 2023-01-17 ENCOUNTER — MYC MEDICAL ADVICE (OUTPATIENT)
Dept: FAMILY MEDICINE | Facility: CLINIC | Age: 38
End: 2023-01-17
Payer: COMMERCIAL

## 2023-01-17 DIAGNOSIS — M54.50 ACUTE BILATERAL LOW BACK PAIN WITHOUT SCIATICA: ICD-10-CM

## 2023-01-17 DIAGNOSIS — K62.5 BRBPR (BRIGHT RED BLOOD PER RECTUM): Primary | ICD-10-CM

## 2023-01-17 RX ORDER — POLYETHYLENE GLYCOL 3350 17 G/17G
1 POWDER, FOR SOLUTION ORAL DAILY
Qty: 850 G | Refills: 11 | Status: SHIPPED | OUTPATIENT
Start: 2023-01-17 | End: 2023-07-06

## 2023-01-19 RX ORDER — PREDNISONE 20 MG/1
TABLET ORAL
Qty: 20 TABLET | Refills: 0 | Status: SHIPPED | OUTPATIENT
Start: 2023-01-19 | End: 2023-07-06

## 2023-04-23 ENCOUNTER — HEALTH MAINTENANCE LETTER (OUTPATIENT)
Age: 38
End: 2023-04-23

## 2023-06-08 ENCOUNTER — TRANSFERRED RECORDS (OUTPATIENT)
Dept: HEALTH INFORMATION MANAGEMENT | Facility: CLINIC | Age: 38
End: 2023-06-08
Payer: COMMERCIAL

## 2023-07-06 ENCOUNTER — TELEPHONE (OUTPATIENT)
Dept: FAMILY MEDICINE | Facility: CLINIC | Age: 38
End: 2023-07-06

## 2023-07-06 ENCOUNTER — OFFICE VISIT (OUTPATIENT)
Dept: FAMILY MEDICINE | Facility: CLINIC | Age: 38
End: 2023-07-06
Payer: COMMERCIAL

## 2023-07-06 VITALS
DIASTOLIC BLOOD PRESSURE: 87 MMHG | HEIGHT: 70 IN | HEART RATE: 89 BPM | SYSTOLIC BLOOD PRESSURE: 127 MMHG | WEIGHT: 251.4 LBS | OXYGEN SATURATION: 100 % | TEMPERATURE: 99.8 F | BODY MASS INDEX: 35.99 KG/M2

## 2023-07-06 DIAGNOSIS — I10 ESSENTIAL HYPERTENSION WITH GOAL BLOOD PRESSURE LESS THAN 140/90: ICD-10-CM

## 2023-07-06 DIAGNOSIS — R50.9 FEVER, UNSPECIFIED FEVER CAUSE: Primary | ICD-10-CM

## 2023-07-06 DIAGNOSIS — J02.0 STREPTOCOCCAL PHARYNGITIS: ICD-10-CM

## 2023-07-06 LAB
DEPRECATED S PYO AG THROAT QL EIA: POSITIVE
SARS-COV-2 RNA RESP QL NAA+PROBE: NEGATIVE

## 2023-07-06 PROCEDURE — 87880 STREP A ASSAY W/OPTIC: CPT | Performed by: FAMILY MEDICINE

## 2023-07-06 PROCEDURE — 87635 SARS-COV-2 COVID-19 AMP PRB: CPT | Performed by: FAMILY MEDICINE

## 2023-07-06 PROCEDURE — 99213 OFFICE O/P EST LOW 20 MIN: CPT | Performed by: FAMILY MEDICINE

## 2023-07-06 RX ORDER — AMOXICILLIN 500 MG/1
500 CAPSULE ORAL 2 TIMES DAILY
Qty: 20 CAPSULE | Refills: 0 | Status: SHIPPED | OUTPATIENT
Start: 2023-07-06 | End: 2023-12-28

## 2023-07-06 RX ORDER — LOSARTAN POTASSIUM 25 MG/1
25 TABLET ORAL DAILY
Qty: 90 TABLET | Refills: 1 | Status: SHIPPED | OUTPATIENT
Start: 2023-07-06 | End: 2023-08-09

## 2023-07-06 ASSESSMENT — PAIN SCALES - GENERAL: PAINLEVEL: EXTREME PAIN (9)

## 2023-07-06 NOTE — TELEPHONE ENCOUNTER
Reason for Call:  Appointment Request    Patient requesting this type of appt:  Same day office appointment    Requested provider: Gadiel Gamez    Reason patient unable to be scheduled: Not within requested timeframe    When does patient want to be seen/preferred time: Same day    Comments: Pt would like call back if Dr Gamez can see pt in clinic today 7/6 for fever not resolving. No current openings, please call pt back to discuss.    Could we send this information to you in SCHEDit or would you prefer to receive a phone call?:   Patient would prefer a phone call   Okay to leave a detailed message?: Yes at Cell number on file:    Telephone Information:   Mobile [phone]       Call taken on 7/6/2023 at 7:36 AM by Elsa Melgar

## 2023-07-06 NOTE — PROGRESS NOTES
"Sona Pena is a 38 year old, presenting for the following health issues:  No chief complaint on file.        7/6/2023     9:37 AM   Additional Questions   Roomed by Denisse Garcia     History of Present Illness       Reason for visit:  Fever chills  Symptom onset:  1-3 days agoHe consumes 2 sweetened beverage(s) daily.He exercises with enough effort to increase his heart rate 9 or less minutes per day.  He exercises with enough effort to increase his heart rate 3 or less days per week.   He is taking medications regularly.       Acute Illness  Acute illness concerns: Fever  Onset/Duration: July 4, 2023  Symptoms:  Fever: YES- 101.4  Chills/Sweats: YES  Headache (location?): YES  Sinus Pressure: No  Conjunctivitis:  No  Ear Pain: no  Rhinorrhea: No  Congestion: No  Sore Throat: YES- feels sticky  Cough: YES  Wheeze: No  Decreased Appetite: YES  Nausea: No  Vomiting: No  Diarrhea: No  Dysuria/Freq.: No  Dysuria or Hematuria: No  Fatigue/Achiness: YES  Sick/Strep Exposure: No  Therapies tried and outcome: Tylenol       Review of Systems   Constitutional, HEENT, cardiovascular, pulmonary, GI, , musculoskeletal, neuro, skin, endocrine and psych systems are negative, except as otherwise noted.      Objective    /87 (BP Location: Left arm, Patient Position: Sitting, Cuff Size: Adult Large)   Pulse 89   Temp 99.8  F (37.7  C) (Tympanic)   Ht 1.775 m (5' 9.88\")   Wt 114 kg (251 lb 6.4 oz)   SpO2 100%   BMI 36.20 kg/m    Body mass index is 36.2 kg/m .  Physical Exam   GENERAL: healthy, alert and no distress  NECK: no adenopathy, no asymmetry, masses, or scars and thyroid normal to palpation  RESP: lungs clear to auscultation - no rales, rhonchi or wheezes  CV: regular rate and rhythm, normal S1 S2, no S3 or S4, no murmur, click or rub, no peripheral edema and peripheral pulses strong  ABDOMEN: soft, nontender, no hepatosplenomegaly, no masses and bowel sounds normal  MS: no gross musculoskeletal " defects noted, no edema    A/P:  (R50.9) Fever, unspecified fever cause  (primary encounter diagnosis)  Comment:   Plan: Symptomatic COVID-19 Virus (Coronavirus) by PCR        Nose, Streptococcus A Rapid Screen w/Reflex to         PCR - Clinic Collect        Likely viral syndrome. Discussed usual course. Discussed symptomatic treatment. R/o COVID-19 and strep. If worsening or not improving as expected, patient will let us know.    (I10) Essential hypertension with goal blood pressure less than 140/90  Comment:   Plan: losartan (COZAAR) 25 MG tablet        Needed refills. Patient will follow up in August for physical.    Gadiel Gamez MD

## 2023-07-07 ENCOUNTER — MYC MEDICAL ADVICE (OUTPATIENT)
Dept: FAMILY MEDICINE | Facility: CLINIC | Age: 38
End: 2023-07-07
Payer: COMMERCIAL

## 2023-07-07 DIAGNOSIS — M62.838 MUSCLE SPASM: Primary | ICD-10-CM

## 2023-07-09 RX ORDER — CYCLOBENZAPRINE HCL 10 MG
10 TABLET ORAL 3 TIMES DAILY PRN
Qty: 40 TABLET | Refills: 3 | Status: SHIPPED | OUTPATIENT
Start: 2023-07-09 | End: 2023-08-09

## 2023-08-09 ENCOUNTER — OFFICE VISIT (OUTPATIENT)
Dept: FAMILY MEDICINE | Facility: CLINIC | Age: 38
End: 2023-08-09
Payer: COMMERCIAL

## 2023-08-09 VITALS
RESPIRATION RATE: 16 BRPM | OXYGEN SATURATION: 97 % | HEIGHT: 70 IN | SYSTOLIC BLOOD PRESSURE: 127 MMHG | TEMPERATURE: 98.3 F | BODY MASS INDEX: 36.79 KG/M2 | WEIGHT: 257 LBS | HEART RATE: 76 BPM | DIASTOLIC BLOOD PRESSURE: 83 MMHG

## 2023-08-09 DIAGNOSIS — L73.9 FOLLICULITIS: ICD-10-CM

## 2023-08-09 DIAGNOSIS — Z13.6 CARDIOVASCULAR SCREENING; LDL GOAL LESS THAN 130: ICD-10-CM

## 2023-08-09 DIAGNOSIS — I10 ESSENTIAL HYPERTENSION WITH GOAL BLOOD PRESSURE LESS THAN 140/90: ICD-10-CM

## 2023-08-09 DIAGNOSIS — Z00.00 ROUTINE GENERAL MEDICAL EXAMINATION AT A HEALTH CARE FACILITY: Primary | ICD-10-CM

## 2023-08-09 DIAGNOSIS — Z13.1 SCREENING FOR DIABETES MELLITUS: ICD-10-CM

## 2023-08-09 LAB
ALBUMIN SERPL BCG-MCNC: 4.4 G/DL (ref 3.5–5.2)
ALP SERPL-CCNC: 93 U/L (ref 40–129)
ALT SERPL W P-5'-P-CCNC: 48 U/L (ref 0–70)
ANION GAP SERPL CALCULATED.3IONS-SCNC: 9 MMOL/L (ref 7–15)
AST SERPL W P-5'-P-CCNC: 39 U/L (ref 0–45)
BILIRUB SERPL-MCNC: 0.6 MG/DL
BUN SERPL-MCNC: 10.3 MG/DL (ref 6–20)
CALCIUM SERPL-MCNC: 9.4 MG/DL (ref 8.6–10)
CHLORIDE SERPL-SCNC: 102 MMOL/L (ref 98–107)
CHOLEST SERPL-MCNC: 189 MG/DL
CREAT SERPL-MCNC: 1.01 MG/DL (ref 0.67–1.17)
CREAT UR-MCNC: 27.7 MG/DL
DEPRECATED HCO3 PLAS-SCNC: 26 MMOL/L (ref 22–29)
GFR SERPL CREATININE-BSD FRML MDRD: >90 ML/MIN/1.73M2
GLUCOSE SERPL-MCNC: 114 MG/DL (ref 70–99)
HDLC SERPL-MCNC: 56 MG/DL
LDLC SERPL CALC-MCNC: 114 MG/DL
MICROALBUMIN UR-MCNC: <12 MG/L
MICROALBUMIN/CREAT UR: NORMAL MG/G{CREAT}
NONHDLC SERPL-MCNC: 133 MG/DL
POTASSIUM SERPL-SCNC: 4.1 MMOL/L (ref 3.4–5.3)
PROT SERPL-MCNC: 7.9 G/DL (ref 6.4–8.3)
SODIUM SERPL-SCNC: 137 MMOL/L (ref 136–145)
TRIGL SERPL-MCNC: 94 MG/DL

## 2023-08-09 PROCEDURE — 36415 COLL VENOUS BLD VENIPUNCTURE: CPT | Performed by: FAMILY MEDICINE

## 2023-08-09 PROCEDURE — 82570 ASSAY OF URINE CREATININE: CPT | Performed by: FAMILY MEDICINE

## 2023-08-09 PROCEDURE — 80061 LIPID PANEL: CPT | Performed by: FAMILY MEDICINE

## 2023-08-09 PROCEDURE — 80053 COMPREHEN METABOLIC PANEL: CPT | Performed by: FAMILY MEDICINE

## 2023-08-09 PROCEDURE — 99396 PREV VISIT EST AGE 40-64: CPT | Performed by: FAMILY MEDICINE

## 2023-08-09 PROCEDURE — 82043 UR ALBUMIN QUANTITATIVE: CPT | Performed by: FAMILY MEDICINE

## 2023-08-09 RX ORDER — LOSARTAN POTASSIUM 25 MG/1
25 TABLET ORAL DAILY
Qty: 90 TABLET | Refills: 3 | Status: SHIPPED | OUTPATIENT
Start: 2023-08-09 | End: 2024-07-22

## 2023-08-09 RX ORDER — MUPIROCIN 20 MG/G
OINTMENT TOPICAL 3 TIMES DAILY
Qty: 30 G | Refills: 3 | Status: SHIPPED | OUTPATIENT
Start: 2023-08-09 | End: 2023-08-19

## 2023-08-09 ASSESSMENT — ENCOUNTER SYMPTOMS
WEAKNESS: 0
DIZZINESS: 0
NERVOUS/ANXIOUS: 0
PARESTHESIAS: 0
MYALGIAS: 0
HEMATURIA: 0
EYE PAIN: 0
JOINT SWELLING: 0
HEARTBURN: 0
NAUSEA: 0
PALPITATIONS: 0
COUGH: 0
SHORTNESS OF BREATH: 0
CHILLS: 0
DIARRHEA: 0
DYSURIA: 0
ABDOMINAL PAIN: 0
FREQUENCY: 0
HEMATOCHEZIA: 0
CONSTIPATION: 0
FEVER: 0
SORE THROAT: 0
ARTHRALGIAS: 0
HEADACHES: 0

## 2023-08-09 ASSESSMENT — PAIN SCALES - GENERAL: PAINLEVEL: NO PAIN (0)

## 2023-08-09 NOTE — PROGRESS NOTES
SUBJECTIVE:   CC: Jean is an 38 year old who presents for preventative health visit.       8/9/2023     9:35 AM   Additional Questions   Roomed by Clifton   Accompanied by Self       Healthy Habits:     Getting at least 3 servings of Calcium per day:  NO    Bi-annual eye exam:  NO    Dental care twice a year:  Yes    Sleep apnea or symptoms of sleep apnea:  Daytime drowsiness    Diet:  Regular (no restrictions)    Frequency of exercise:  None    Taking medications regularly:  Yes    Medication side effects:  None    Additional concerns today:  No      Today's PHQ-2 Score:       8/9/2023     9:25 AM   PHQ-2 ( 1999 Pfizer)   Q1: Little interest or pleasure in doing things 0   Q2: Feeling down, depressed or hopeless 0   PHQ-2 Score 0   Q1: Little interest or pleasure in doing things Not at all   Q2: Feeling down, depressed or hopeless Not at all   PHQ-2 Score 0     Concerned about some folliculitis flare on inner thighs and abdomen. States random, painful but usually resolves.     States that he also has some tiredness, and generally feels lazy, does not exercise. Diet is ok.     Reports some vision weakness, on the computer all day.     Denies fever, no other symptoms of strep throat, resolved. Np longer taking flexeril.         Social History     Tobacco Use    Smoking status: Never    Smokeless tobacco: Never   Substance Use Topics    Alcohol use: Yes     Comment: 2-3 drinks every month              8/9/2023     9:25 AM   Alcohol Use   Prescreen: >3 drinks/day or >7 drinks/week? No          No data to display                Last PSA: No results found for: PSA    Reviewed orders with patient. Reviewed health maintenance and updated orders accordingly - Yes  Lab work is in process  Labs reviewed in EPIC  BP Readings from Last 3 Encounters:   08/09/23 127/83   07/06/23 127/87   03/17/22 132/82    Wt Readings from Last 3 Encounters:   08/09/23 116.6 kg (257 lb)   07/06/23 114 kg (251 lb 6.4 oz)   03/17/22 113.5 kg (250  lb 3.2 oz)                  Patient Active Problem List   Diagnosis    CARDIOVASCULAR SCREENING; LDL GOAL LESS THAN 160    CHONDROMALACIA, KNEE - right    MEDIAL MENISCUS TEAR - right    Obesity, Class I, BMI 30-34.9    Renal cyst    Mesenteric lymphadenitis    Latent tuberculosis by blood test    Essential hypertension with goal blood pressure less than 140/90    Morbid obesity (H)     Past Surgical History:   Procedure Laterality Date    COLONOSCOPY WITH CO2 INSUFFLATION N/A 8/5/2016    Procedure: COLONOSCOPY WITH CO2 INSUFFLATION;  Surgeon: Duane, William Charles, MD;  Location: [facility]    COMBINED ESOPHAGOSCOPY, GASTROSCOPY, DUODENOSCOPY (EGD) WITH CO2 INSUFFLATION N/A 6/29/2016    Procedure: COMBINED ESOPHAGOSCOPY, GASTROSCOPY, DUODENOSCOPY (EGD) WITH CO2 INSUFFLATION;  Surgeon: Duane, William Charles, MD;  Location: [facility]    ESOPHAGOSCOPY, GASTROSCOPY, DUODENOSCOPY (EGD), COMBINED N/A 6/29/2016    Procedure: COMBINED ESOPHAGOSCOPY, GASTROSCOPY, DUODENOSCOPY (EGD), BIOPSY SINGLE OR MULTIPLE;  Surgeon: Duane, William Charles, MD;  Location: [facility]    HC CAPSULE ENDOSCOPY N/A 3/27/2017    Procedure: CAPSULE/PILL CAM ENDOSCOPY;  Surgeon: Chavez Mirza MD;  Location: [facility]       Social History     Tobacco Use    Smoking status: Never    Smokeless tobacco: Never   Substance Use Topics    Alcohol use: Yes     Comment: 2-3 drinks every month      Family History   Problem Relation Age of Onset    Hypertension Mother     Hypertension Father     Diabetes Father     Cerebrovascular Disease Maternal Grandmother     Cancer Maternal Grandfather         throat cancer    Arthritis Paternal Grandmother          Current Outpatient Medications   Medication Sig Dispense Refill    losartan (COZAAR) 25 MG tablet Take 1 tablet (25 mg) by mouth daily for blood pressure 90 tablet 3    mupirocin (BACTROBAN) 2 % external ointment Apply topically 3 times daily for 10 days 30 g 3     No Known Allergies    Reviewed and updated as needed  "this visit by clinical staff   Tobacco  Allergies  Meds  Problems  Med Hx  Surg Hx           Reviewed and updated as needed this visit by Provider                     Review of Systems   Constitutional:  Negative for chills and fever.   HENT:  Negative for congestion, ear pain, hearing loss and sore throat.    Eyes:  Positive for visual disturbance. Negative for pain.   Respiratory:  Negative for cough and shortness of breath.    Cardiovascular:  Negative for chest pain, palpitations and peripheral edema.   Gastrointestinal:  Negative for abdominal pain, constipation, diarrhea, heartburn, hematochezia and nausea.   Genitourinary:  Negative for dysuria, frequency, genital sores, hematuria, impotence, penile discharge and urgency.   Musculoskeletal:  Negative for arthralgias, joint swelling and myalgias.   Skin:  Positive for rash.   Neurological:  Negative for dizziness, weakness, headaches and paresthesias.   Psychiatric/Behavioral:  Negative for mood changes. The patient is not nervous/anxious.      CONSTITUTIONAL: NEGATIVE for fever, chills, change in weight  INTEGUMENTARY/SKIN: NEGATIVE for worrisome rashes, moles or lesions  EYES: NEGATIVE for vision changes or irritation  ENT: NEGATIVE for ear, mouth and throat problems  RESP: NEGATIVE for significant cough or SOB  CV: NEGATIVE for chest pain, palpitations or peripheral edema  GI: NEGATIVE for nausea, abdominal pain, heartburn, or change in bowel habits   male: negative for dysuria, hematuria, decreased urinary stream, erectile dysfunction, urethral discharge  MUSCULOSKELETAL: NEGATIVE for significant arthralgias or myalgia  NEURO: NEGATIVE for weakness, dizziness or paresthesias  PSYCHIATRIC: NEGATIVE for changes in mood or affect    OBJECTIVE:   /83 (BP Location: Left arm, Patient Position: Chair, Cuff Size: Adult Large)   Pulse 76   Temp 98.3  F (36.8  C) (Oral)   Resp 16   Ht 1.775 m (5' 9.88\")   Wt 116.6 kg (257 lb)   SpO2 97%   BMI " 37.00 kg/m      Physical Exam  GENERAL: healthy, alert and no distress  NECK: no adenopathy, no asymmetry, masses, or scars and thyroid normal to palpation  RESP: lungs clear to auscultation - no rales, rhonchi or wheezes  CV: regular rate and rhythm, normal S1 S2, no S3 or S4, no murmur, click or rub, no peripheral edema and peripheral pulses strong  ABDOMEN: soft, nontender, no hepatosplenomegaly, no masses and bowel sounds normal  MS: no gross musculoskeletal defects noted, no edema    Diagnostic Test Results:  Labs reviewed in Epic    ASSESSMENT/PLAN:   (Z00.00) Routine general medical examination at a health care facility  (primary encounter diagnosis)  Comment:   Plan: as below.    (I10) Essential hypertension with goal blood pressure less than 140/90  Comment:   Plan: Albumin Random Urine Quantitative with Creat         Ratio, Comprehensive metabolic panel (BMP +         Alb, Alk Phos, ALT, AST, Total. Bili, TP),         losartan (COZAAR) 25 MG tablet        Controlled. Continue with losartan. Recheck renal function and lytes.    (Z13.6) CARDIOVASCULAR SCREENING; LDL GOAL LESS THAN 130  Comment:   Plan: Lipid panel reflex to direct LDL Non-fasting,         Comprehensive metabolic panel (BMP + Alb, Alk         Phos, ALT, AST, Total. Bili, TP)            (Z13.1) Screening for diabetes mellitus  Comment:   Plan: Albumin Random Urine Quantitative with Creat         Ratio, Comprehensive metabolic panel (BMP +         Alb, Alk Phos, ALT, AST, Total. Bili, TP)        Discussed trying to get weight down. Patient has family history of diabetes in father.    (L73.9) Folliculitis  Comment:   Plan: mupirocin (BACTROBAN) 2 % external ointment        Intermittent. May use topical antibiotic as needed.    Patient has been advised of split billing requirements and indicates understanding: Yes      COUNSELING:   Reviewed preventive health counseling, as reflected in patient instructions       Regular exercise       Healthy  "diet/nutrition       Vision screening      BMI:   Estimated body mass index is 37 kg/m  as calculated from the following:    Height as of this encounter: 1.775 m (5' 9.88\").    Weight as of this encounter: 116.6 kg (257 lb).         He reports that he has never smoked. He has never used smokeless tobacco.            Gadiel Gamez MD, MD  [facility]  "

## 2023-08-09 NOTE — PATIENT INSTRUCTIONS
At [facility], we strive to deliver an exceptional experience to you, every time we see you. If you receive a survey, please complete it as we do value your feedback.  If you have MyChart, you can expect to receive results automatically within 24 hours of their completion.  Your provider will send a note interpreting your results as well.   If you do not have MyChart, you should receive your results in about a week by mail.    Your care team:                            Family Medicine Internal Medicine   MD Duglas Gresham MD Shantel Branch-Fleming, MD Srinivasa Vaka, MD Katya Belousova, YASMEEN RosalesHillSHAD Yang CNP, MD Pediatrics   Tobin Herman, MD June Mckinney MD Amelia Massimini APRN CNP   Janelle Rhoades, APRN CNP MD Jorge Loaiza MD          Clinic hours: Monday - Thursday 7 am-6 pm; Fridays 7 am-5 pm.   Urgent care: Monday - Friday 10 am- 8 pm; Saturday and Sunday 9 am-5 pm.    Clinic: [phone]       [facility]: Monday - Thursday 8 am - 7 pm; Friday 8 am - 6 pm  [facility]: [phone]     Preventive Health Recommendations  Male Ages 40 to 49    Yearly exam:             See your health care provider every year in order to  o   Review health changes.   o   Discuss preventive care.    o   Review your medicines if your doctor has prescribed any.  You should be tested each year for STDs (sexually transmitted diseases) if you re at risk.   Have a cholesterol test every 5 years.   Have a colonoscopy (test for colon cancer) if someone in your family has had colon cancer or polyps before age 50.   After age 45, have a diabetes test (fasting glucose). If you are at risk for diabetes, you should have this test every 3 years.    Talk with your health care provider about whether or not a prostate cancer screening test (PSA) is right for  you.    Shots: Get a flu shot each year. Get a tetanus shot every 10 years.     Nutrition:  Eat at least 5 servings of fruits and vegetables daily.   Eat whole-grain bread, whole-wheat pasta and brown rice instead of white grains and rice.   Get adequate Calcium and Vitamin D.     Lifestyle  Exercise for at least 150 minutes a week (30 minutes a day, 5 days a week). This will help you control your weight and prevent disease.   Limit alcohol to one drink per day.   No smoking.   Wear sunscreen to prevent skin cancer.   See your dentist every six months for an exam and cleaning.

## 2023-11-27 ENCOUNTER — TELEPHONE (OUTPATIENT)
Dept: FAMILY MEDICINE | Facility: CLINIC | Age: 38
End: 2023-11-27

## 2023-11-27 ENCOUNTER — ANCILLARY PROCEDURE (OUTPATIENT)
Dept: GENERAL RADIOLOGY | Facility: CLINIC | Age: 38
End: 2023-11-27
Payer: COMMERCIAL

## 2023-11-27 ENCOUNTER — OFFICE VISIT (OUTPATIENT)
Dept: URGENT CARE | Facility: URGENT CARE | Age: 38
End: 2023-11-27
Payer: COMMERCIAL

## 2023-11-27 VITALS
HEART RATE: 73 BPM | DIASTOLIC BLOOD PRESSURE: 82 MMHG | RESPIRATION RATE: 16 BRPM | SYSTOLIC BLOOD PRESSURE: 131 MMHG | OXYGEN SATURATION: 100 % | TEMPERATURE: 97.8 F | BODY MASS INDEX: 37.23 KG/M2 | WEIGHT: 258.6 LBS

## 2023-11-27 DIAGNOSIS — R06.2 WHEEZING: ICD-10-CM

## 2023-11-27 DIAGNOSIS — J40 BRONCHITIS: ICD-10-CM

## 2023-11-27 DIAGNOSIS — R05.1 ACUTE COUGH: Primary | ICD-10-CM

## 2023-11-27 PROCEDURE — 71046 X-RAY EXAM CHEST 2 VIEWS: CPT | Mod: TC | Performed by: RADIOLOGY

## 2023-11-27 PROCEDURE — 99213 OFFICE O/P EST LOW 20 MIN: CPT

## 2023-11-27 RX ORDER — LEVALBUTEROL TARTRATE 45 UG/1
2 AEROSOL, METERED ORAL EVERY 6 HOURS PRN
Qty: 15 G | Refills: 0 | Status: SHIPPED | OUTPATIENT
Start: 2023-11-27

## 2023-11-27 RX ORDER — BENZONATATE 200 MG/1
200 CAPSULE ORAL 3 TIMES DAILY PRN
Qty: 21 CAPSULE | Refills: 0 | Status: SHIPPED | OUTPATIENT
Start: 2023-11-27

## 2023-11-27 NOTE — TELEPHONE ENCOUNTER
Reason for Call:  Appointment Request    Patient requesting this type of appt:  Office Visit    Requested provider: Gadiel Gamez    Reason patient unable to be scheduled: Not within requested timeframe    When does patient want to be seen/preferred time: 1-2 days    Comments: Cough for over 2 Weeks, Neg for Covid    Could we send this information to you in [employer] or would you prefer to receive a phone call?:   Patient would prefer a phone call   Okay to leave a detailed message?: Yes at Home number on file [phone] (home)    Call taken on 11/27/2023 at 8:11 AM by Mercedes No

## 2023-11-27 NOTE — PATIENT INSTRUCTIONS
Chest x-ray does not show any pneumonia per the radiologist report.  Use the levalbuterol and benzonatate as prescribed for your symptoms.  Follow-up with your primary care provider should symptoms persist.

## 2023-11-27 NOTE — PROGRESS NOTES
ASSESSMENT:  (R05.1) Acute cough  (primary encounter diagnosis)  Plan: XR Chest 2 Views    (R06.2) Wheezing  Plan: levalbuterol (XOPENEX HFA) 45 MCG/ACT inhaler    (J40) Bronchitis  Plan: benzonatate (TESSALON) 200 MG capsule    PLAN:  Informed the patient that the chest x-ray does not show any pneumonia per the radiologist report.  Bronchitis patient instructions discussed and provided.  We discussed the need to use the levalbuterol and benzonatate as prescribed for his symptoms.  We also discussed the need to follow-up with his primary care provider should symptoms persist.  Patient acknowledged his understanding of the above plan.    The use of Dragon/PowerMic dictation services may have been used to construct the content in this note; any grammatical or spelling errors are non-intentional. Please contact the author of this note directly if you are in need of any clarification.      Ricky Emery, SHAD CNP      SUBJECTIVE:  Jean Cross is a 38 year old male who presents to the clinic today with a chief complaint of cough  for 2 day(s).  His cough is described as productive green.    The patient's symptoms are severe and worsening.  Associated symptoms include headache. The patient's symptoms are exacerbated by lying on the left side.  Patient has been using OTC cough suppressants  to improve symptoms.    At home COVID test negative.     ROS  Negative except noted above.     OBJECTIVE:  /82 (BP Location: Left arm, Patient Position: Sitting, Cuff Size: Adult Large)   Pulse 73   Temp 97.8  F (36.6  C) (Tympanic)   Resp 16   Wt 117.3 kg (258 lb 9.6 oz)   SpO2 100%   BMI 37.23 kg/m    GENERAL APPEARANCE: healthy, alert and no distress  EYES: EOMI,  PERRL, conjunctiva clear  HENT: nose and mouth without erythema, ulcers or lesions and oral mucous membranes moist, no erythema noted  NECK: supple, nontender, no lymphadenopathy  RESP: expiratory wheezes bilateral  CV: regular rates and rhythm, normal  S1 S2, no murmur noted  SKIN: no suspicious lesions or rashes      X-RAY: normal chest X-ray per radiologist report.

## 2023-11-27 NOTE — TELEPHONE ENCOUNTER
Called and spoke to the patient. He states that he was seen at  and does not need this appt anymore.  Guera Killian MA  [facility]   Primary Care

## 2023-12-01 ENCOUNTER — OFFICE VISIT (OUTPATIENT)
Dept: URGENT CARE | Facility: URGENT CARE | Age: 38
End: 2023-12-01
Payer: COMMERCIAL

## 2023-12-01 VITALS
RESPIRATION RATE: 16 BRPM | HEART RATE: 96 BPM | OXYGEN SATURATION: 98 % | WEIGHT: 254.7 LBS | BODY MASS INDEX: 36.67 KG/M2 | TEMPERATURE: 97.3 F | SYSTOLIC BLOOD PRESSURE: 137 MMHG | DIASTOLIC BLOOD PRESSURE: 84 MMHG

## 2023-12-01 DIAGNOSIS — R07.0 THROAT PAIN: ICD-10-CM

## 2023-12-01 DIAGNOSIS — J22 LOWER RESP. TRACT INFECTION: Primary | ICD-10-CM

## 2023-12-01 LAB
DEPRECATED S PYO AG THROAT QL EIA: NEGATIVE
GROUP A STREP BY PCR: NOT DETECTED

## 2023-12-01 PROCEDURE — 99213 OFFICE O/P EST LOW 20 MIN: CPT | Performed by: PHYSICIAN ASSISTANT

## 2023-12-01 PROCEDURE — 87651 STREP A DNA AMP PROBE: CPT | Performed by: PHYSICIAN ASSISTANT

## 2023-12-01 RX ORDER — AZITHROMYCIN 250 MG/1
TABLET, FILM COATED ORAL
Qty: 6 TABLET | Refills: 0 | Status: SHIPPED | OUTPATIENT
Start: 2023-12-01 | End: 2023-12-06

## 2023-12-01 ASSESSMENT — ENCOUNTER SYMPTOMS
SINUS PRESSURE: 0
MUSCULOSKELETAL NEGATIVE: 1
ALLERGIC/IMMUNOLOGIC NEGATIVE: 1
HEADACHES: 0
MYALGIAS: 0
NAUSEA: 0
HEMATURIA: 0
FEVER: 0
COUGH: 1
CHEST TIGHTNESS: 0
NEUROLOGICAL NEGATIVE: 1
GASTROINTESTINAL NEGATIVE: 1
CHILLS: 0
SINUS PAIN: 0
SHORTNESS OF BREATH: 0
DIARRHEA: 0
PALPITATIONS: 0
CONSTITUTIONAL NEGATIVE: 1
WHEEZING: 0
ABDOMINAL PAIN: 0
SORE THROAT: 0
DYSURIA: 0
FREQUENCY: 0
CARDIOVASCULAR NEGATIVE: 1
VOMITING: 0

## 2023-12-01 ASSESSMENT — PAIN SCALES - GENERAL: PAINLEVEL: NO PAIN (0)

## 2023-12-01 NOTE — PROGRESS NOTES
Chief Complaint:     Chief Complaint   Patient presents with    Cough     Cough for three weeks; patient states cough is worsening. Patient states throat is now hurting and is losing voice. Patient requesting Strep test.        Results for orders placed or performed in visit on 12/01/23   Streptococcus A Rapid Screen w/Reflex to PCR - Clinic Collect     Status: Normal    Specimen: Throat; Swab   Result Value Ref Range    Group A Strep antigen Negative Negative       Medical Decision Making:    Vital signs reviewed by Ovidio Vickers PA-C  /84 (BP Location: Left arm, Patient Position: Sitting, Cuff Size: Adult Large)   Pulse 96   Temp 97.3  F (36.3  C) (Tympanic)   Resp 16   Wt 115.5 kg (254 lb 11.2 oz)   SpO2 98%   BMI 36.67 kg/m      Differential Diagnosis:  URI Adult/Peds:  Bronchitis-viral, Pneumonia, and Viral upper respiratory illness        ASSESSMENT    1. Lower resp. tract infection    2. Throat pain        PLAN    Patient is in no acute distress.    Temp is 97.3 in clinic today, lung sounds were clear, and O2 sats at 98% on RA.    Recent CXR was negative for Pneumonia.  RST was negative.    With length of symptoms, Rx for Zithromax sent in.    Rest, Push fluids, vaporizer.  Ibuprofen and or Tylenol for any fever or body aches.  Over the counter cough suppressant- PRN- as discussed.   If symptoms worsen, recheck immediately otherwise follow up with your PCP in 1 week if symptoms are not improving.  Worrisome symptoms discussed with instructions to go to the ED.  Patient verbalized understanding and agreed with this plan.    Labs:    Results for orders placed or performed in visit on 12/01/23   Streptococcus A Rapid Screen w/Reflex to PCR - Clinic Collect     Status: Normal    Specimen: Throat; Swab   Result Value Ref Range    Group A Strep antigen Negative Negative        Vital signs reviewed by Ovidio Vickers PA-C  /84 (BP Location: Left arm, Patient Position: Sitting, Cuff Size: Adult  Large)   Pulse 96   Temp 97.3  F (36.3  C) (Tympanic)   Resp 16   Wt 115.5 kg (254 lb 11.2 oz)   SpO2 98%   BMI 36.67 kg/m      Current Meds      Current Outpatient Medications:     azithromycin (ZITHROMAX) 250 MG tablet, Take 2 tablets (500 mg) by mouth daily for 1 day, THEN 1 tablet (250 mg) daily for 4 days., Disp: 6 tablet, Rfl: 0    benzonatate (TESSALON) 200 MG capsule, Take 1 capsule (200 mg) by mouth 3 times daily as needed for cough, Disp: 21 capsule, Rfl: 0    levalbuterol (XOPENEX HFA) 45 MCG/ACT inhaler, Inhale 2 puffs into the lungs every 6 hours as needed for wheezing, Disp: 15 g, Rfl: 0    losartan (COZAAR) 25 MG tablet, Take 1 tablet (25 mg) by mouth daily for blood pressure, Disp: 90 tablet, Rfl: 3      Respiratory History    occasional episodes of bronchitis      SUBJECTIVE    HPI: Jean Cross is an 38 year old male who presents with chest congestion and cough occasional, productive mucoid.  Symptoms began 3  weeks ago and has unchanged.  There is no shortness of breath, wheezing, and chest pain.  Patient is eating and drinking well.  No fever, nausea, vomiting, or diarrhea.    Patient denies any recent travel or exposure to known COVID positive tested individual.      ROS:     Review of Systems   Constitutional: Negative.  Negative for chills and fever.   HENT:  Positive for congestion. Negative for ear discharge, ear pain, sinus pressure, sinus pain and sore throat.    Respiratory:  Positive for cough. Negative for chest tightness, shortness of breath and wheezing.    Cardiovascular: Negative.  Negative for chest pain and palpitations.   Gastrointestinal: Negative.  Negative for abdominal pain, diarrhea, nausea and vomiting.   Genitourinary:  Negative for dysuria, frequency, hematuria and urgency.   Musculoskeletal: Negative.  Negative for myalgias.   Skin:  Negative for rash.   Allergic/Immunologic: Negative.  Negative for immunocompromised state.   Neurological: Negative.  Negative for  headaches.         Family History   Family History   Problem Relation Age of Onset    Hypertension Mother     Hypertension Father     Diabetes Father     Cerebrovascular Disease Maternal Grandmother     Cancer Maternal Grandfather         throat cancer    Arthritis Paternal Grandmother         Problem history  Patient Active Problem List   Diagnosis    CARDIOVASCULAR SCREENING; LDL GOAL LESS THAN 160    CHONDROMALACIA, KNEE - right    MEDIAL MENISCUS TEAR - right    Obesity, Class I, BMI 30-34.9    Renal cyst    Mesenteric lymphadenitis    Latent tuberculosis by blood test    Essential hypertension with goal blood pressure less than 140/90    Morbid obesity (H)        Allergies  No Known Allergies     Social History  Social History     Socioeconomic History    Marital status:      Spouse name: Not on file    Number of children: Not on file    Years of education: Not on file    Highest education level: Not on file   Occupational History    Occupation: quality engineer     Employer: [employer]   Tobacco Use    Smoking status: Never     Passive exposure: Never    Smokeless tobacco: Never   Substance and Sexual Activity    Alcohol use: Yes     Comment: 2-3 drinks every month     Drug use: No    Sexual activity: Yes     Partners: Female     Birth control/protection: Condom   Other Topics Concern    Parent/sibling w/ CABG, MI or angioplasty before 65F 55M? No   Social History Narrative    Not on file     Social Determinants of Health     Financial Resource Strain: Not on file   Food Insecurity: Not on file   Transportation Needs: Not on file   Physical Activity: Not on file   Stress: Not on file   Social Connections: Not on file   Interpersonal Safety: Not on file   Housing Stability: Not on file        OBJECTIVE     Vital signs reviewed by Ovidio Vickers PA-C  /84 (BP Location: Left arm, Patient Position: Sitting, Cuff Size: Adult Large)   Pulse 96   Temp 97.3  F (36.3  C) (Tympanic)   Resp  16   Wt 115.5 kg (254 lb 11.2 oz)   SpO2 98%   BMI 36.67 kg/m       Physical Exam      Ovidio Vickers PA-C  12/1/2023, 11:26 AM

## 2023-12-27 ENCOUNTER — E-VISIT (OUTPATIENT)
Dept: FAMILY MEDICINE | Facility: CLINIC | Age: 38
End: 2023-12-27
Payer: COMMERCIAL

## 2023-12-27 ENCOUNTER — MYC MEDICAL ADVICE (OUTPATIENT)
Dept: FAMILY MEDICINE | Facility: CLINIC | Age: 38
End: 2023-12-27
Payer: COMMERCIAL

## 2023-12-27 DIAGNOSIS — Z20.818 STREPTOCOCCUS EXPOSURE: ICD-10-CM

## 2023-12-27 DIAGNOSIS — J02.9 SORE THROAT: Primary | ICD-10-CM

## 2023-12-27 PROCEDURE — 99421 OL DIG E/M SVC 5-10 MIN: CPT | Performed by: FAMILY MEDICINE

## 2023-12-28 RX ORDER — AMOXICILLIN 500 MG/1
500 CAPSULE ORAL 2 TIMES DAILY
Qty: 20 CAPSULE | Refills: 0 | Status: SHIPPED | OUTPATIENT
Start: 2023-12-28

## 2024-07-10 ENCOUNTER — PATIENT OUTREACH (OUTPATIENT)
Dept: CARE COORDINATION | Facility: CLINIC | Age: 39
End: 2024-07-10
Payer: COMMERCIAL

## 2024-07-22 DIAGNOSIS — I10 ESSENTIAL HYPERTENSION WITH GOAL BLOOD PRESSURE LESS THAN 140/90: ICD-10-CM

## 2024-07-22 RX ORDER — LOSARTAN POTASSIUM 25 MG/1
25 TABLET ORAL DAILY
Qty: 30 TABLET | Refills: 0 | Status: SHIPPED | OUTPATIENT
Start: 2024-07-22

## 2024-07-24 ENCOUNTER — PATIENT OUTREACH (OUTPATIENT)
Dept: CARE COORDINATION | Facility: CLINIC | Age: 39
End: 2024-07-24
Payer: COMMERCIAL

## 2024-10-10 ENCOUNTER — MYC MEDICAL ADVICE (OUTPATIENT)
Dept: FAMILY MEDICINE | Facility: CLINIC | Age: 39
End: 2024-10-10

## 2024-10-10 NOTE — TELEPHONE ENCOUNTER
Routing TellWiset message to provider for review. Please advise. If this is something you would do, is it ok to use a same day slot? Ric Hurley RN, BSN

## 2024-10-10 NOTE — TELEPHONE ENCOUNTER
Okay to schedule patient for skin tag removal. Please allow for 40 minutes slot. Okay to use same-day slot if needed.    Gadiel Gamez MD

## 2024-10-10 NOTE — TELEPHONE ENCOUNTER
Called and spoke to the patient and scheduled a 40 min skin tag removal for 11/8/2024 ok per Dr Gamez.  Guera Killian [facility]   Primary Care

## 2024-11-16 ENCOUNTER — HEALTH MAINTENANCE LETTER (OUTPATIENT)
Age: 39
End: 2024-11-16

## 2024-11-18 ENCOUNTER — MYC REFILL (OUTPATIENT)
Dept: FAMILY MEDICINE | Facility: CLINIC | Age: 39
End: 2024-11-18

## 2024-11-18 DIAGNOSIS — I10 ESSENTIAL HYPERTENSION WITH GOAL BLOOD PRESSURE LESS THAN 140/90: ICD-10-CM

## 2024-11-18 RX ORDER — LOSARTAN POTASSIUM 25 MG/1
25 TABLET ORAL DAILY
Qty: 30 TABLET | Refills: 0 | Status: SHIPPED | OUTPATIENT
Start: 2024-11-18

## 2024-12-29 DIAGNOSIS — I10 ESSENTIAL HYPERTENSION WITH GOAL BLOOD PRESSURE LESS THAN 140/90: ICD-10-CM

## 2024-12-30 RX ORDER — LOSARTAN POTASSIUM 25 MG/1
25 TABLET ORAL DAILY
Qty: 30 TABLET | Refills: 0 | Status: SHIPPED | OUTPATIENT
Start: 2024-12-30

## 2025-01-17 SDOH — HEALTH STABILITY: PHYSICAL HEALTH: ON AVERAGE, HOW MANY DAYS PER WEEK DO YOU ENGAGE IN MODERATE TO STRENUOUS EXERCISE (LIKE A BRISK WALK)?: 0 DAYS

## 2025-01-17 SDOH — HEALTH STABILITY: PHYSICAL HEALTH: ON AVERAGE, HOW MANY MINUTES DO YOU ENGAGE IN EXERCISE AT THIS LEVEL?: 0 MIN

## 2025-01-17 ASSESSMENT — SOCIAL DETERMINANTS OF HEALTH (SDOH): HOW OFTEN DO YOU GET TOGETHER WITH FRIENDS OR RELATIVES?: ONCE A WEEK

## 2025-01-22 ENCOUNTER — OFFICE VISIT (OUTPATIENT)
Dept: FAMILY MEDICINE | Facility: CLINIC | Age: 40
End: 2025-01-22
Payer: COMMERCIAL

## 2025-01-22 VITALS
HEART RATE: 84 BPM | OXYGEN SATURATION: 100 % | SYSTOLIC BLOOD PRESSURE: 139 MMHG | HEIGHT: 71 IN | BODY MASS INDEX: 37.24 KG/M2 | DIASTOLIC BLOOD PRESSURE: 87 MMHG | TEMPERATURE: 98.1 F | WEIGHT: 266 LBS

## 2025-01-22 DIAGNOSIS — G89.29 CHRONIC PAIN OF RIGHT KNEE: ICD-10-CM

## 2025-01-22 DIAGNOSIS — Z13.6 CARDIOVASCULAR SCREENING; LDL GOAL LESS THAN 130: ICD-10-CM

## 2025-01-22 DIAGNOSIS — N28.1 RENAL CYST, RIGHT: ICD-10-CM

## 2025-01-22 DIAGNOSIS — M25.561 CHRONIC PAIN OF RIGHT KNEE: ICD-10-CM

## 2025-01-22 DIAGNOSIS — I10 ESSENTIAL HYPERTENSION WITH GOAL BLOOD PRESSURE LESS THAN 140/90: ICD-10-CM

## 2025-01-22 DIAGNOSIS — Z13.1 SCREENING FOR DIABETES MELLITUS: ICD-10-CM

## 2025-01-22 DIAGNOSIS — Z00.00 ROUTINE GENERAL MEDICAL EXAMINATION AT A HEALTH CARE FACILITY: Primary | ICD-10-CM

## 2025-01-22 DIAGNOSIS — Z23 ENCOUNTER FOR IMMUNIZATION: ICD-10-CM

## 2025-01-22 PROBLEM — E66.01 MORBID OBESITY (H): Status: RESOLVED | Noted: 2022-03-17 | Resolved: 2025-01-22

## 2025-01-22 LAB
ALBUMIN SERPL BCG-MCNC: 4.3 G/DL (ref 3.5–5.2)
ALP SERPL-CCNC: 87 U/L (ref 40–150)
ALT SERPL W P-5'-P-CCNC: 88 U/L (ref 0–70)
ANION GAP SERPL CALCULATED.3IONS-SCNC: 11 MMOL/L (ref 7–15)
AST SERPL W P-5'-P-CCNC: 49 U/L (ref 0–45)
BILIRUB SERPL-MCNC: 0.5 MG/DL
BUN SERPL-MCNC: 9.7 MG/DL (ref 6–20)
CALCIUM SERPL-MCNC: 9.7 MG/DL (ref 8.8–10.4)
CHLORIDE SERPL-SCNC: 104 MMOL/L (ref 98–107)
CHOLEST SERPL-MCNC: 192 MG/DL
CREAT SERPL-MCNC: 1.05 MG/DL (ref 0.67–1.17)
EGFRCR SERPLBLD CKD-EPI 2021: >90 ML/MIN/1.73M2
EST. AVERAGE GLUCOSE BLD GHB EST-MCNC: 137 MG/DL
FASTING STATUS PATIENT QL REPORTED: NO
FASTING STATUS PATIENT QL REPORTED: NO
GLUCOSE SERPL-MCNC: 119 MG/DL (ref 70–99)
HBA1C MFR BLD: 6.4 % (ref 0–5.6)
HCO3 SERPL-SCNC: 26 MMOL/L (ref 22–29)
HDLC SERPL-MCNC: 51 MG/DL
LDLC SERPL CALC-MCNC: 115 MG/DL
NONHDLC SERPL-MCNC: 141 MG/DL
POTASSIUM SERPL-SCNC: 4.8 MMOL/L (ref 3.4–5.3)
PROT SERPL-MCNC: 7.4 G/DL (ref 6.4–8.3)
SODIUM SERPL-SCNC: 141 MMOL/L (ref 135–145)
TRIGL SERPL-MCNC: 129 MG/DL

## 2025-01-22 PROCEDURE — 36415 COLL VENOUS BLD VENIPUNCTURE: CPT | Performed by: FAMILY MEDICINE

## 2025-01-22 PROCEDURE — 99213 OFFICE O/P EST LOW 20 MIN: CPT | Mod: 25 | Performed by: FAMILY MEDICINE

## 2025-01-22 PROCEDURE — 82043 UR ALBUMIN QUANTITATIVE: CPT | Performed by: FAMILY MEDICINE

## 2025-01-22 PROCEDURE — 82570 ASSAY OF URINE CREATININE: CPT | Performed by: FAMILY MEDICINE

## 2025-01-22 PROCEDURE — 80053 COMPREHEN METABOLIC PANEL: CPT | Performed by: FAMILY MEDICINE

## 2025-01-22 PROCEDURE — 99396 PREV VISIT EST AGE 40-64: CPT | Performed by: FAMILY MEDICINE

## 2025-01-22 PROCEDURE — 83036 HEMOGLOBIN GLYCOSYLATED A1C: CPT | Performed by: FAMILY MEDICINE

## 2025-01-22 PROCEDURE — 80061 LIPID PANEL: CPT | Performed by: FAMILY MEDICINE

## 2025-01-22 RX ORDER — LOSARTAN POTASSIUM 25 MG/1
25 TABLET ORAL DAILY
Qty: 90 TABLET | Refills: 3 | Status: SHIPPED | OUTPATIENT
Start: 2025-01-22 | End: 2025-01-22

## 2025-01-22 RX ORDER — LOSARTAN POTASSIUM 25 MG/1
25 TABLET ORAL DAILY
Qty: 90 TABLET | Refills: 3 | Status: SHIPPED | OUTPATIENT
Start: 2025-01-22

## 2025-01-22 ASSESSMENT — PAIN SCALES - GENERAL: PAINLEVEL_OUTOF10: NO PAIN (0)

## 2025-01-22 NOTE — PROGRESS NOTES
"Preventive Care Visit  [facility]  Gadiel Gamez MD, MD, Family Medicine  Jan 22, 2025      Assessment & Plan     (Z00.00) Routine general medical examination at a health care facility  (primary encounter diagnosis)  Comment:   Plan: as below.    (I10) Essential hypertension with goal blood pressure less than 140/90  Comment:   Plan: Comprehensive metabolic panel (BMP + Alb, Alk         Phos, ALT, AST, Total. Bili, TP), Albumin         Random Urine Quantitative with Creat Ratio,         losartan (COZAAR) 25 MG tablet, DISCONTINUED:         losartan (COZAAR) 25 MG tablet        Borderline today but patient stated bp's at home much better. No changes. Monitor renal function and lytes.    (Z13.6) CARDIOVASCULAR SCREENING; LDL GOAL LESS THAN 130  Comment:   Plan: Lipid panel reflex to direct LDL Fasting,         Comprehensive metabolic panel (BMP + Alb, Alk         Phos, ALT, AST, Total. Bili, TP)            (N28.1) Renal cyst, right  Comment:   Plan: US Renal Complete Non-Vascular        Follow up with ultrasound.    (Z13.1) Screening for diabetes mellitus  Comment:   Plan: Comprehensive metabolic panel (BMP + Alb, Alk         Phos, ALT, AST, Total. Bili, TP), Hemoglobin         A1c        Discussed trying to lose weight to decrease risk for developing diabetes in the future.    (M25.561,  G89.29) Chronic pain of right knee  Comment:   Plan: Physical Therapy  Referral        History of meniscus tear. Patient would like to try physical therapy first.    (Z23) Encounter for immunization  Comment:   Plan: patient declines covid vaccine today.    Patient has been advised of split billing requirements and indicates understanding: Yes        BMI  Estimated body mass index is 37.03 kg/m  as calculated from the following:    Height as of this encounter: 1.805 m (5' 11.06\").    Weight as of this encounter: 120.7 kg (266 lb).       Counseling  Appropriate preventive services were addressed with " this patient via screening, questionnaire, or discussion as appropriate for fall prevention, nutrition, physical activity, Tobacco-use cessation, social engagement, weight loss and cognition.  Checklist reviewing preventive services available has been given to the patient.  Reviewed patient's diet, addressing concerns and/or questions.       Work on weight loss  Regular exercise    Sona Pena is a 40 year old, presenting for the following:  Physical        1/22/2025     8:13 AM   Additional Questions   Roomed by Casandra RUSSO    Hypertension Follow-up    Do you check your blood pressure regularly outside of the clinic? Yes   Are you following a low salt diet? Yes  Are your blood pressures ever more than 140 on the top number (systolic) OR more   than 90 on the bottom number (diastolic), for example 140/90? No  Health Care Directive  Patient does not have a Health Care Directive: Discussed advance care planning with patient; information given to patient to review.      1/17/2025   General Health   How would you rate your overall physical health? Good   Feel stress (tense, anxious, or unable to sleep) Patient declined         1/17/2025   Nutrition   Three or more servings of calcium each day? (!) NO   Diet: Regular (no restrictions)   How many servings of fruit and vegetables per day? (!) 0-1   How many sweetened beverages each day? 0-1         1/17/2025   Exercise   Days per week of moderate/strenous exercise 0 days   Average minutes spent exercising at this level 0 min   (!) EXERCISE CONCERN      1/17/2025   Social Factors   Frequency of gathering with friends or relatives Once a week   Worry food won't last until get money to buy more No   Food not last or not have enough money for food? No   Do you have housing? (Housing is defined as stable permanent housing and does not include staying ouside in a car, in a tent, in an abandoned building, in an overnight shelter, or couch-surfing.) Yes   Are you  worried about losing your housing? No   Lack of transportation? No   Unable to get utilities (heat,electricity)? No         1/17/2025   Dental   Dentist two times every year? Yes            Today's PHQ-2 Score:       1/22/2025     8:01 AM   PHQ-2 ( 1999 Pf[employer])   Q1: Little interest or pleasure in doing things 0   Q2: Feeling down, depressed or hopeless 0   PHQ-2 Score 0    Q1: Little interest or pleasure in doing things Not at all   Q2: Feeling down, depressed or hopeless Not at all   PHQ-2 Score 0       Patient-reported           1/17/2025   Substance Use   Alcohol more than 3/day or more than 7/wk No   Do you use any other substances recreationally? No     Social History     Tobacco Use    Smoking status: Never     Passive exposure: Never    Smokeless tobacco: Never   Substance Use Topics    Alcohol use: Yes     Comment: 2-3 drinks every month     Drug use: No           1/17/2025   STI Screening   New sexual partner(s) since last STI/HIV test? No   ASCVD Risk   The 10-year ASCVD risk score (Alma DK, et al., 2019) is: 1.5%    Values used to calculate the score:      Age: 40 years      Sex: Male      Is Non- : No      Diabetic: No      Tobacco smoker: No      Systolic Blood Pressure: 139 mmHg      Is BP treated: Yes      HDL Cholesterol: 56 mg/dL      Total Cholesterol: 189 mg/dL        1/17/2025   Contraception/Family Planning   Questions about contraception or family planning No        Reviewed and updated as needed this visit by Provider                    Past Medical History:   Diagnosis Date    HTN (hypertension)      Past Surgical History:   Procedure Laterality Date    COLONOSCOPY WITH CO2 INSUFFLATION N/A 8/5/2016    Procedure: COLONOSCOPY WITH CO2 INSUFFLATION;  Surgeon: Duane, William Charles, MD;  Location: [facility]    COMBINED ESOPHAGOSCOPY, GASTROSCOPY, DUODENOSCOPY (EGD) WITH CO2 INSUFFLATION N/A 6/29/2016    Procedure: COMBINED ESOPHAGOSCOPY, GASTROSCOPY, DUODENOSCOPY  (EGD) WITH CO2 INSUFFLATION;  Surgeon: Duane, William Charles, MD;  Location: [facility]    ESOPHAGOSCOPY, GASTROSCOPY, DUODENOSCOPY (EGD), COMBINED N/A 6/29/2016    Procedure: COMBINED ESOPHAGOSCOPY, GASTROSCOPY, DUODENOSCOPY (EGD), BIOPSY SINGLE OR MULTIPLE;  Surgeon: Duane, William Charles, MD;  Location: [facility] OR    HC CAPSULE ENDOSCOPY N/A 3/27/2017    Procedure: CAPSULE/PILL CAM ENDOSCOPY;  Surgeon: Chavez Mirza MD;  Location:  GI     Lab work is in process  Labs reviewed in EPIC  BP Readings from Last 3 Encounters:   01/22/25 (!) 139/87   12/01/23 137/84   11/27/23 131/82    Wt Readings from Last 3 Encounters:   01/22/25 120.7 kg (266 lb)   12/01/23 115.5 kg (254 lb 11.2 oz)   11/27/23 117.3 kg (258 lb 9.6 oz)                  Patient Active Problem List   Diagnosis    CARDIOVASCULAR SCREENING; LDL GOAL LESS THAN 160    CHONDROMALACIA, KNEE - right    MEDIAL MENISCUS TEAR - right    Obesity, Class I, BMI 30-34.9    Renal cyst    Mesenteric lymphadenitis    Latent tuberculosis by blood test    Essential hypertension with goal blood pressure less than 140/90     Past Surgical History:   Procedure Laterality Date    COLONOSCOPY WITH CO2 INSUFFLATION N/A 8/5/2016    Procedure: COLONOSCOPY WITH CO2 INSUFFLATION;  Surgeon: Duane, William Charles, MD;  Location: [facility]    COMBINED ESOPHAGOSCOPY, GASTROSCOPY, DUODENOSCOPY (EGD) WITH CO2 INSUFFLATION N/A 6/29/2016    Procedure: COMBINED ESOPHAGOSCOPY, GASTROSCOPY, DUODENOSCOPY (EGD) WITH CO2 INSUFFLATION;  Surgeon: Duane, William Charles, MD;  Location: [facility]    ESOPHAGOSCOPY, GASTROSCOPY, DUODENOSCOPY (EGD), COMBINED N/A 6/29/2016    Procedure: COMBINED ESOPHAGOSCOPY, GASTROSCOPY, DUODENOSCOPY (EGD), BIOPSY SINGLE OR MULTIPLE;  Surgeon: Duane, William Charles, MD;  Location: [facility]    HC CAPSULE ENDOSCOPY N/A 3/27/2017    Procedure: CAPSULE/PILL CAM ENDOSCOPY;  Surgeon: Chavez Mirza MD;  Location:  GI       Social History     Tobacco Use    Smoking status:  "Never     Passive exposure: Never    Smokeless tobacco: Never   Substance Use Topics    Alcohol use: Yes     Comment: 2-3 drinks every month      Family History   Problem Relation Age of Onset    Hypertension Mother     Hypertension Father     Diabetes Father     Cerebrovascular Disease Maternal Grandmother     Cancer Maternal Grandfather         throat cancer    Arthritis Paternal Grandmother          Current Outpatient Medications   Medication Sig Dispense Refill    losartan (COZAAR) 25 MG tablet Take 1 tablet (25 mg) by mouth daily. for blood pressure. 90 tablet 3     No Known Allergies  Recent Labs   Lab Test 08/09/23  1007 03/25/22  0916 10/22/20  1748 08/22/19  1828 05/17/19  1548 04/25/19  1639 05/23/18  0741 02/28/17  1139   * 98 135*  --   --   --    < >  --    HDL 56 55 55  --   --   --    < >  --    TRIG 94 121 110  --   --   --    < >  --    ALT 48 83* 82*  --    < > 90*   < > 52   CR 1.01 1.03 0.90  --   --  1.09   < > 0.91   GFRESTIMATED >90 >90 >90  --   --  87   < > >90  Non  GFR Calc     GFRESTBLACK  --   --  >90  --   --  >90   < > >90  African American GFR Calc     POTASSIUM 4.1 4.0 4.4  --   --  3.7   < > 3.9   TSH  --   --   --  1.75  --   --   --  0.84    < > = values in this interval not displayed.          Review of Systems  Constitutional, HEENT, cardiovascular, pulmonary, GI, , musculoskeletal, neuro, skin, endocrine and psych systems are negative, except as otherwise noted.     Objective    Exam  BP (!) 139/87 (BP Location: Left arm, Patient Position: Sitting, Cuff Size: Adult Large)   Pulse 84   Temp 98.1  F (36.7  C) (Temporal)   Ht 1.805 m (5' 11.06\")   Wt 120.7 kg (266 lb)   SpO2 100%   BMI 37.03 kg/m     Estimated body mass index is 37.03 kg/m  as calculated from the following:    Height as of this encounter: 1.805 m (5' 11.06\").    Weight as of this encounter: 120.7 kg (266 lb).    Physical Exam  GENERAL: alert and no distress  NECK: no adenopathy, no " asymmetry, masses, or scars  RESP: lungs clear to auscultation - no rales, rhonchi or wheezes  CV: regular rate and rhythm, normal S1 S2, no S3 or S4, no murmur, click or rub, no peripheral edema  ABDOMEN: soft, nontender, no hepatosplenomegaly, no masses and bowel sounds normal  MS: no gross musculoskeletal defects noted, no edema        Signed Electronically by: Gadiel Gamez MD, MD

## 2025-01-22 NOTE — PATIENT INSTRUCTIONS
At [facility], we strive to deliver an exceptional experience to you, every time we see you. If you receive a survey, please let us know what we are doing well and/or what we could improve upon, as we do value your feedback.  If you have MyChart, you can expect to receive results automatically within 24 hours of their completion.  Your provider will send a note interpreting your results as well.   If you do not have MyChart, you should receive your results in about a week by mail.    Your care team:                            Family Medicine Internal Medicine   MD Duglas Gresham, MD Katy Weber, MD Oswaldo Houston, MD Macrina Bianchi, PANidaC    Gadiel Gamez, MD Pediatrics   Ayesha Thomason, MD June Bazan, MD Janelle Rhoades, APRN CNP Renee Alex APRN CNP   MD Adelaide Hubbard, MD Yuliana Trejo, CNP     Tayo Nunes, CNP Same-Day Provider (No follow-up visits)   SHAD Pickard, DNP Audrey Merritt, SHAD Comer, FNP, BC JEANIE ConnC     Clinic hours: Monday - Thursday 7 am-6 pm; Fridays 7 am-5 pm.   Urgent care: Monday - Friday 10 am- 8 pm; Saturday and Sunday 9 am-5 pm.    Clinic: [phone]       [facility]: Monday - Thursday 8 am - 7 pm; Friday 8 am - 6 pm  [facility]: [phone]

## 2025-01-23 LAB
CREAT UR-MCNC: 24.8 MG/DL
MICROALBUMIN UR-MCNC: <12 MG/L
MICROALBUMIN/CREAT UR: NORMAL MG/G{CREAT}

## (undated) RX ORDER — SIMETHICONE 40MG/0.6ML
SUSPENSION, DROPS(FINAL DOSAGE FORM)(ML) ORAL
Status: DISPENSED
Start: 2017-03-27